# Patient Record
Sex: FEMALE | Race: WHITE | Employment: FULL TIME | URBAN - METROPOLITAN AREA
[De-identification: names, ages, dates, MRNs, and addresses within clinical notes are randomized per-mention and may not be internally consistent; named-entity substitution may affect disease eponyms.]

---

## 2017-02-17 ENCOUNTER — HOSPITAL ENCOUNTER (OUTPATIENT)
Dept: CT IMAGING | Facility: HOSPITAL | Age: 42
Discharge: HOME | End: 2017-02-17
Attending: INTERNAL MEDICINE
Payer: COMMERCIAL

## 2017-02-17 ENCOUNTER — LAB ENCOUNTER (OUTPATIENT)
Dept: LAB | Facility: HOSPITAL | Age: 42
End: 2017-02-17
Attending: INTERNAL MEDICINE
Payer: COMMERCIAL

## 2017-02-17 DIAGNOSIS — C85.88 MARGINAL ZONE LYMPHOMA OF LYMPH NODES OF MULTIPLE SITES (HCC): ICD-10-CM

## 2017-02-17 DIAGNOSIS — E55.9 VITAMIN D DEFICIENCY: ICD-10-CM

## 2017-02-17 DIAGNOSIS — N92.6 IRREGULAR MENSES: Primary | ICD-10-CM

## 2017-02-17 DIAGNOSIS — L65.9 ALOPECIA: ICD-10-CM

## 2017-02-17 LAB
25(OH)D3 SERPL-MCNC: 41.4 NG/ML
ALBUMIN SERPL BCP-MCNC: 3.8 G/DL (ref 3.5–4.8)
ALBUMIN/GLOB SERPL: 1.8 {RATIO} (ref 1–2)
ALP SERPL-CCNC: 49 U/L (ref 32–100)
ALT SERPL-CCNC: 13 U/L (ref 14–54)
ANION GAP SERPL CALC-SCNC: 5 MMOL/L (ref 0–18)
AST SERPL-CCNC: 17 U/L (ref 15–41)
BASOPHILS # BLD: 0 K/UL (ref 0–0.2)
BASOPHILS NFR BLD: 0 %
BILIRUB SERPL-MCNC: 0.8 MG/DL (ref 0.3–1.2)
BUN SERPL-MCNC: 11 MG/DL (ref 8–20)
BUN/CREAT SERPL: 11.8 (ref 10–20)
CALCIUM SERPL-MCNC: 8.8 MG/DL (ref 8.5–10.5)
CHLORIDE SERPL-SCNC: 106 MMOL/L (ref 95–110)
CHOLEST SERPL-MCNC: 114 MG/DL (ref 110–200)
CO2 SERPL-SCNC: 27 MMOL/L (ref 22–32)
CREAT BLD-MCNC: 0.9 MG/DL (ref 0.5–1.5)
CREAT SERPL-MCNC: 0.93 MG/DL (ref 0.5–1.5)
EOSINOPHIL # BLD: 0 K/UL (ref 0–0.7)
EOSINOPHIL NFR BLD: 1 %
ERYTHROCYTE [DISTWIDTH] IN BLOOD BY AUTOMATED COUNT: 14.6 % (ref 11–15)
FSH SERPL-ACNC: 8.9 MIU/ML
GLOBULIN PLAS-MCNC: 2.1 G/DL (ref 2.5–3.7)
GLUCOSE SERPL-MCNC: 96 MG/DL (ref 70–99)
HCT VFR BLD AUTO: 36 % (ref 35–48)
HDLC SERPL-MCNC: 30 MG/DL
HGB BLD-MCNC: 12.4 G/DL (ref 12–16)
LDH SERPL L TO P-CCNC: 137 U/L (ref 98–192)
LDLC SERPL CALC-MCNC: 74 MG/DL (ref 0–99)
LH SERPL-ACNC: 16.2 MIU/ML
LYMPHOCYTES # BLD: 1.8 K/UL (ref 1–4)
LYMPHOCYTES NFR BLD: 34 %
MCH RBC QN AUTO: 35.2 PG (ref 27–32)
MCHC RBC AUTO-ENTMCNC: 34.6 G/DL (ref 32–37)
MCV RBC AUTO: 101.9 FL (ref 80–100)
MONOCYTES # BLD: 0.3 K/UL (ref 0–1)
MONOCYTES NFR BLD: 6 %
NEUTROPHILS # BLD AUTO: 3 K/UL (ref 1.8–7.7)
NEUTROPHILS NFR BLD: 58 %
NONHDLC SERPL-MCNC: 84 MG/DL
OSMOLALITY UR CALC.SUM OF ELEC: 285 MOSM/KG (ref 275–295)
PLATELET # BLD AUTO: 85 K/UL (ref 140–400)
PMV BLD AUTO: 9.9 FL (ref 7.4–10.3)
POTASSIUM SERPL-SCNC: 4 MMOL/L (ref 3.3–5.1)
PROT SERPL-MCNC: 5.9 G/DL (ref 5.9–8.4)
RBC # BLD AUTO: 3.53 M/UL (ref 3.7–5.4)
SODIUM SERPL-SCNC: 138 MMOL/L (ref 136–144)
TRIGL SERPL-MCNC: 52 MG/DL (ref 1–149)
TSH SERPL-ACNC: 5.59 UIU/ML (ref 0.34–5.6)
WBC # BLD AUTO: 5.2 K/UL (ref 4–11)

## 2017-02-17 PROCEDURE — 83002 ASSAY OF GONADOTROPIN (LH): CPT

## 2017-02-17 PROCEDURE — 84443 ASSAY THYROID STIM HORMONE: CPT

## 2017-02-17 PROCEDURE — 83615 LACTATE (LD) (LDH) ENZYME: CPT

## 2017-02-17 PROCEDURE — 80061 LIPID PANEL: CPT

## 2017-02-17 PROCEDURE — 82565 ASSAY OF CREATININE: CPT

## 2017-02-17 PROCEDURE — 80053 COMPREHEN METABOLIC PANEL: CPT

## 2017-02-17 PROCEDURE — 74177 CT ABD & PELVIS W/CONTRAST: CPT

## 2017-02-17 PROCEDURE — 36415 COLL VENOUS BLD VENIPUNCTURE: CPT

## 2017-02-17 PROCEDURE — 83001 ASSAY OF GONADOTROPIN (FSH): CPT

## 2017-02-17 PROCEDURE — 70491 CT SOFT TISSUE NECK W/DYE: CPT

## 2017-02-17 PROCEDURE — 71260 CT THORAX DX C+: CPT

## 2017-02-17 PROCEDURE — 85025 COMPLETE CBC W/AUTO DIFF WBC: CPT

## 2017-02-17 PROCEDURE — 82306 VITAMIN D 25 HYDROXY: CPT

## 2017-03-01 ENCOUNTER — OFFICE VISIT (OUTPATIENT)
Dept: HEMATOLOGY/ONCOLOGY | Facility: HOSPITAL | Age: 42
End: 2017-03-01
Attending: INTERNAL MEDICINE
Payer: COMMERCIAL

## 2017-03-01 VITALS
TEMPERATURE: 98 F | HEART RATE: 56 BPM | DIASTOLIC BLOOD PRESSURE: 65 MMHG | SYSTOLIC BLOOD PRESSURE: 113 MMHG | WEIGHT: 132 LBS | RESPIRATION RATE: 16 BRPM | BODY MASS INDEX: 24.29 KG/M2 | HEIGHT: 62 IN

## 2017-03-01 DIAGNOSIS — C85.88 MARGINAL ZONE LYMPHOMA OF LYMPH NODES OF MULTIPLE SITES (HCC): Primary | ICD-10-CM

## 2017-03-01 PROCEDURE — 99212 OFFICE O/P EST SF 10 MIN: CPT | Performed by: INTERNAL MEDICINE

## 2017-03-01 PROCEDURE — 99214 OFFICE O/P EST MOD 30 MIN: CPT | Performed by: INTERNAL MEDICINE

## 2017-03-01 RX ORDER — LEVOTHYROXINE SODIUM 0.05 MG/1
TABLET ORAL
Refills: 3 | COMMUNITY
Start: 2017-02-23 | End: 2017-07-06

## 2017-03-01 NOTE — PROGRESS NOTES
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 Negative for dizziness, light-headedness and numbness. Hematological: Positive for adenopathy (skin neck and R upper arm.). Bruises/bleeds easily.    Psychiatric/Behavioral: Negative for confusion and sleep • Cancer Maternal Grandfather      lung cancer         PHYSICAL EXAM:    /65 mmHg  Pulse 56  Temp(Src) 98 °F (36.7 °C) (Oral)  Resp 16  Ht 1.575 m (5' 2\")  Wt 59.875 kg (132 lb)  BMI 24.14 kg/m2     Wt Readings from Last 6 Encounters:  03/01/17 : 59 Left cervical: No superficial cervical, no deep cervical and no posterior cervical adenopathy present. She has no axillary adenopathy. Right axillary: No pectoral and no lateral adenopathy present.         Left axillary: No pectoral and no l D/w the patient that if she develops new GI symptoms, should have evaluation by GI with endoscopy to r/o involvement of the GI tract as this is a common site of involvement for marginal zone lymphoma.     Migraines:  rec d/w PCP and also eye exam as has not 34 44   Monocytes %       6 6   Eosinophils %       1 1   Basophils %       0 1   Neutrophils Absolute      1.8-7.7 K/UL 3.0 2.6   Lymphocytes Absolute      1.0-4.0 K/UL 1.8 2.4   Monocytes Absolute      0.0-1.0 K/UL 0.3 0.3   Eosinophils Absolute OTHER:            Posterior cervical soft tissue nodules measure up to approximately 4 mm. Surgical changes present in the right posterolateral cervical region. No recurrent disease responding to this region.  The left posterior nodules previously measured x 3.4 cm (remeasured). More inferiorly there is a precaval low density soft tissue mass measuring 4.8 x 2.9 cm, previously 3.8 x 2.1 cm. Multiple additional low attenuation retroperitoneal lymph nodes are present.  Left pericardiac index node measures 11

## 2017-05-03 ENCOUNTER — TELEPHONE (OUTPATIENT)
Dept: HEMATOLOGY/ONCOLOGY | Facility: HOSPITAL | Age: 42
End: 2017-05-03

## 2017-05-03 NOTE — TELEPHONE ENCOUNTER
PLEASE CALL PT TODAY [phone] REGARDING PT'S CT CHEST/AB/PELVIS HAS BEEN PENDING REVIEW THRU INS SINCE 3/1/17-KALE

## 2017-05-10 ENCOUNTER — TELEPHONE (OUTPATIENT)
Dept: HEMATOLOGY/ONCOLOGY | Facility: HOSPITAL | Age: 42
End: 2017-05-10

## 2017-05-12 ENCOUNTER — TELEPHONE (OUTPATIENT)
Dept: HEMATOLOGY/ONCOLOGY | Facility: HOSPITAL | Age: 42
End: 2017-05-12

## 2017-05-15 ENCOUNTER — TELEPHONE (OUTPATIENT)
Dept: HEMATOLOGY/ONCOLOGY | Facility: HOSPITAL | Age: 42
End: 2017-05-15

## 2017-05-15 DIAGNOSIS — C85.88 MARGINAL ZONE LYMPHOMA OF LYMPH NODES OF MULTIPLE SITES (HCC): Primary | ICD-10-CM

## 2017-05-15 NOTE — TELEPHONE ENCOUNTER
I called Phyllis Olivares to confirm authorization has been completed and she is able to schedule CT. She is requesting a call back from \"Dr. Elijah Gabriel RN. I feel I should also have one on my neck\".

## 2017-05-15 NOTE — TELEPHONE ENCOUNTER
Returned phone call. Pt states \" I feel like the lymph nodes in my neck are a little more raised and feel strained. I usually have a CT of my neck at the same time as my other CT. Should I have a CT of my neck too? \" Pt denies pain in neck states \" Just

## 2017-05-16 ENCOUNTER — TELEPHONE (OUTPATIENT)
Dept: HEMATOLOGY/ONCOLOGY | Facility: HOSPITAL | Age: 42
End: 2017-05-16

## 2017-05-16 NOTE — TELEPHONE ENCOUNTER
Pt called and left message that an order for CT soft tissue of neck and CT chest, abdomen and pelvis was ordered per Dr. Venkatesh Pérez. Informed to wait till authorized by insurance before scheduling.  Instructed to call if had any further questions or concerns [phone]

## 2017-05-18 DIAGNOSIS — C85.88 MARGINAL ZONE LYMPHOMA OF LYMPH NODES OF MULTIPLE SITES (HCC): Primary | ICD-10-CM

## 2017-06-01 ENCOUNTER — HOSPITAL ENCOUNTER (OUTPATIENT)
Dept: CT IMAGING | Facility: HOSPITAL | Age: 42
Discharge: HOME | End: 2017-06-01
Attending: INTERNAL MEDICINE
Payer: COMMERCIAL

## 2017-06-01 ENCOUNTER — LAB ENCOUNTER (OUTPATIENT)
Dept: LAB | Facility: HOSPITAL | Age: 42
End: 2017-06-01
Attending: INTERNAL MEDICINE
Payer: COMMERCIAL

## 2017-06-01 DIAGNOSIS — C83.38 RETICULOSARCOMA OF LYMPH NODES OF MULTIPLE SITES (HCC): Primary | ICD-10-CM

## 2017-06-01 DIAGNOSIS — E03.9 HYPOTHYROIDISM, UNSPECIFIED TYPE: ICD-10-CM

## 2017-06-01 DIAGNOSIS — C85.88 MARGINAL ZONE LYMPHOMA OF LYMPH NODES OF MULTIPLE SITES (HCC): ICD-10-CM

## 2017-06-01 PROCEDURE — 71260 CT THORAX DX C+: CPT | Performed by: INTERNAL MEDICINE

## 2017-06-01 PROCEDURE — 74177 CT ABD & PELVIS W/CONTRAST: CPT | Performed by: INTERNAL MEDICINE

## 2017-06-01 PROCEDURE — 80053 COMPREHEN METABOLIC PANEL: CPT

## 2017-06-01 PROCEDURE — 36415 COLL VENOUS BLD VENIPUNCTURE: CPT

## 2017-06-01 PROCEDURE — 83615 LACTATE (LD) (LDH) ENZYME: CPT

## 2017-06-01 PROCEDURE — 70491 CT SOFT TISSUE NECK W/DYE: CPT | Performed by: INTERNAL MEDICINE

## 2017-06-01 PROCEDURE — 85025 COMPLETE CBC W/AUTO DIFF WBC: CPT

## 2017-06-01 PROCEDURE — 84443 ASSAY THYROID STIM HORMONE: CPT

## 2017-06-06 ENCOUNTER — OFFICE VISIT (OUTPATIENT)
Dept: HEMATOLOGY/ONCOLOGY | Facility: HOSPITAL | Age: 42
End: 2017-06-06
Attending: INTERNAL MEDICINE
Payer: COMMERCIAL

## 2017-06-06 VITALS
SYSTOLIC BLOOD PRESSURE: 106 MMHG | TEMPERATURE: 98 F | HEIGHT: 62 IN | RESPIRATION RATE: 16 BRPM | BODY MASS INDEX: 23.92 KG/M2 | DIASTOLIC BLOOD PRESSURE: 70 MMHG | HEART RATE: 48 BPM | WEIGHT: 130 LBS

## 2017-06-06 DIAGNOSIS — C85.88 MARGINAL ZONE LYMPHOMA OF LYMPH NODES OF MULTIPLE SITES (HCC): Primary | ICD-10-CM

## 2017-06-06 PROCEDURE — 99212 OFFICE O/P EST SF 10 MIN: CPT | Performed by: INTERNAL MEDICINE

## 2017-06-06 PROCEDURE — 99214 OFFICE O/P EST MOD 30 MIN: CPT | Performed by: INTERNAL MEDICINE

## 2017-06-06 NOTE — PROGRESS NOTES
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 Neurological: Negative for dizziness, light-headedness, numbness and headaches. Hematological: Positive for adenopathy (skin neck and R upper arm.). Bruises/bleeds easily.    Psychiatric/Behavi • Cancer Maternal Grandmother      ovarian cancer   • Cancer Maternal Grandfather      lung cancer         PHYSICAL EXAM:    /70 mmHg  Pulse 48  Temp(Src) 97.7 °F (36.5 °C) (Oral)  Resp 16  Ht 1.575 m (5' 2\")  Wt 58.968 kg (130 lb)  BMI 23.77 kg/m2 Right cervical: No superficial cervical, no deep cervical and no posterior cervical adenopathy present. Left cervical: No superficial cervical, no deep cervical and no posterior cervical adenopathy present. She has no axillary adenopathy. PET/CT is recommended only at time of indication for treatment initiation.     D/w the patient that if she develops new GI symptoms, should have evaluation by GI with endoscopy to r/o involvement of the GI tract as this is a common site of involvement for m 27.0-32.0 pg 33.4 (H) 35.2 (H) 34.0 (H)   MCHC      32.0-37.0 g/dl 34.6 34.6 34.3   RDW      11.0-15.0 % 14.6 14.6 14.6   Platelet Count      555-050 K/ (L) 85 (L) 88 (L)   MEAN PLATELET VOLUME      7.4-10.3 fL 9.6 9.9 9.4   Neutrophils %       5 UPPER AERODIGESTIVE TRACT:         Nasopharynx and oropharynx are normal. Dental hardware limits evaluation of the oral cavity. No oral cavity mass. Epiglottis, glottis and subglottic airway are normal. No suspect mass in the upper aerodigestive tract.   PA * Trachea and mainstem bronchi are normal.    CHEST WALL:  Cutaneous and subcutaneous left upper back soft tissue nodularity unchanged. Largest left axillary node measures 10 x 12 mm. No axillary node meeting size criteria for enlargement.   OTHER: Approved by (CST): Shilpa Masters MD on 6/01/2017 at 12:48            =====  CONCLUSION:    1.  Cutaneous and subcutaneous nodularity related to lymphomatous infiltration left posterior base of neck, left shoulder and back probably unchanged when accounting

## 2017-06-12 NOTE — PROGRESS NOTES
Quick Note:    Please have patient make appointment to discuss blood test results and adjust medication.  Thank you.  ______

## 2017-06-14 NOTE — PROGRESS NOTES
Quick Note:    [facility] pt [facility]    Patient declined offer to schedule an appointment.        ______

## 2017-08-22 ENCOUNTER — HOSPITAL ENCOUNTER (OUTPATIENT)
Dept: ULTRASOUND IMAGING | Facility: HOSPITAL | Age: 42
Discharge: HOME | End: 2017-08-22
Attending: INTERNAL MEDICINE
Payer: COMMERCIAL

## 2017-08-22 DIAGNOSIS — N92.6 IRREGULAR MENSES: ICD-10-CM

## 2017-08-22 PROCEDURE — 76856 US EXAM PELVIC COMPLETE: CPT | Performed by: INTERNAL MEDICINE

## 2017-08-22 PROCEDURE — 76830 TRANSVAGINAL US NON-OB: CPT | Performed by: INTERNAL MEDICINE

## 2017-09-09 ENCOUNTER — LAB ENCOUNTER (OUTPATIENT)
Dept: LAB | Facility: HOSPITAL | Age: 42
End: 2017-09-09
Attending: INTERNAL MEDICINE
Payer: COMMERCIAL

## 2017-09-09 DIAGNOSIS — C85.88 MARGINAL ZONE LYMPHOMA OF LYMPH NODES OF MULTIPLE SITES (HCC): ICD-10-CM

## 2017-09-09 DIAGNOSIS — E03.9 HYPOTHYROIDISM, UNSPECIFIED TYPE: ICD-10-CM

## 2017-09-09 LAB
ALBUMIN SERPL BCP-MCNC: 4 G/DL (ref 3.5–4.8)
ALBUMIN/GLOB SERPL: 1.6 {RATIO} (ref 1–2)
ALP SERPL-CCNC: 61 U/L (ref 32–100)
ALT SERPL-CCNC: 15 U/L (ref 14–54)
ANION GAP SERPL CALC-SCNC: 5 MMOL/L (ref 0–18)
AST SERPL-CCNC: 18 U/L (ref 15–41)
BASOPHILS # BLD: 0 K/UL (ref 0–0.2)
BASOPHILS NFR BLD: 1 %
BILIRUB SERPL-MCNC: 0.9 MG/DL (ref 0.3–1.2)
BUN SERPL-MCNC: 12 MG/DL (ref 8–20)
BUN/CREAT SERPL: 13.5 (ref 10–20)
CALCIUM SERPL-MCNC: 9.7 MG/DL (ref 8.5–10.5)
CHLORIDE SERPL-SCNC: 105 MMOL/L (ref 95–110)
CO2 SERPL-SCNC: 29 MMOL/L (ref 22–32)
CREAT SERPL-MCNC: 0.89 MG/DL (ref 0.5–1.5)
EOSINOPHIL # BLD: 0 K/UL (ref 0–0.7)
EOSINOPHIL NFR BLD: 1 %
ERYTHROCYTE [DISTWIDTH] IN BLOOD BY AUTOMATED COUNT: 14.7 % (ref 11–15)
GLOBULIN PLAS-MCNC: 2.5 G/DL (ref 2.5–3.7)
GLUCOSE SERPL-MCNC: 79 MG/DL (ref 70–99)
HCT VFR BLD AUTO: 36.8 % (ref 35–48)
HGB BLD-MCNC: 13.1 G/DL (ref 12–16)
LDH SERPL L TO P-CCNC: 170 U/L (ref 98–192)
LYMPHOCYTES # BLD: 1.8 K/UL (ref 1–4)
LYMPHOCYTES NFR BLD: 36 %
MCH RBC QN AUTO: 35 PG (ref 27–32)
MCHC RBC AUTO-ENTMCNC: 35.7 G/DL (ref 32–37)
MCV RBC AUTO: 98 FL (ref 80–100)
MONOCYTES # BLD: 0.5 K/UL (ref 0–1)
MONOCYTES NFR BLD: 9 %
NEUTROPHILS # BLD AUTO: 2.8 K/UL (ref 1.8–7.7)
NEUTROPHILS NFR BLD: 54 %
OSMOLALITY UR CALC.SUM OF ELEC: 287 MOSM/KG (ref 275–295)
PLATELET # BLD AUTO: 98 K/UL (ref 140–400)
PMV BLD AUTO: 9.1 FL (ref 7.4–10.3)
POTASSIUM SERPL-SCNC: 4.6 MMOL/L (ref 3.3–5.1)
PROT SERPL-MCNC: 6.5 G/DL (ref 5.9–8.4)
RBC # BLD AUTO: 3.76 M/UL (ref 3.7–5.4)
SODIUM SERPL-SCNC: 139 MMOL/L (ref 136–144)
TSH SERPL-ACNC: 2.21 UIU/ML (ref 0.45–5.33)
WBC # BLD AUTO: 5.2 K/UL (ref 4–11)

## 2017-09-09 PROCEDURE — 80053 COMPREHEN METABOLIC PANEL: CPT

## 2017-09-09 PROCEDURE — 84443 ASSAY THYROID STIM HORMONE: CPT

## 2017-09-09 PROCEDURE — 36415 COLL VENOUS BLD VENIPUNCTURE: CPT

## 2017-09-09 PROCEDURE — 83615 LACTATE (LD) (LDH) ENZYME: CPT

## 2017-09-09 PROCEDURE — 85025 COMPLETE CBC W/AUTO DIFF WBC: CPT

## 2017-09-12 ENCOUNTER — OFFICE VISIT (OUTPATIENT)
Dept: HEMATOLOGY/ONCOLOGY | Facility: HOSPITAL | Age: 42
End: 2017-09-12
Attending: INTERNAL MEDICINE
Payer: COMMERCIAL

## 2017-09-12 VITALS
HEART RATE: 56 BPM | WEIGHT: 130 LBS | HEIGHT: 62 IN | TEMPERATURE: 97 F | BODY MASS INDEX: 23.92 KG/M2 | RESPIRATION RATE: 16 BRPM | DIASTOLIC BLOOD PRESSURE: 66 MMHG | SYSTOLIC BLOOD PRESSURE: 118 MMHG

## 2017-09-12 DIAGNOSIS — C85.80 MARGINAL ZONE LYMPHOMA (HCC): Primary | ICD-10-CM

## 2017-09-12 PROCEDURE — 99212 OFFICE O/P EST SF 10 MIN: CPT | Performed by: INTERNAL MEDICINE

## 2017-09-12 PROCEDURE — 99213 OFFICE O/P EST LOW 20 MIN: CPT | Performed by: INTERNAL MEDICINE

## 2017-09-12 NOTE — PROGRESS NOTES
VERÓNICA     Rene Lynne is a 36year old female here for f/u of Marginal zone lymphoma (hcc)  (primary encounter diagnosis)     Pt saw Dr Austen Lima at [facility] for a second opinion (Dec 2015) who agreed on observation only for the time being.       Pt here today Allergic/Immunologic: Positive for environmental allergies. Neurological: Negative for dizziness, weakness, light-headedness, numbness and headaches. Has had vertigo   Hematological: Positive for adenopathy (skin neck and R upper arm.).  Bruises/bl • Cancer Mother      Ovarian- passed away at 46   • Cancer Maternal Grandmother      ovarian cancer   • Cancer Maternal Grandfather      lung cancer         PHYSICAL EXAM:    /66 (BP Location: Left arm, Patient Position: Sitting, Cuff Size: adult) Head (left side): No submental, no submandibular, no tonsillar, no preauricular, no posterior auricular and no occipital adenopathy present. She has no cervical adenopathy.         Right cervical: No superficial cervical, no deep cervical and no po CT done and she has stable disease. She is still minimally symptomatic. Patient was reassured and is to call if new symptoms arise that indicate progression. PET/CT is recommended only at time of indication for treatment initiation.     D/w the pierre MCHC      32.0 - 37.0 g/dl 35.7 34.6 34.6   RDW      11.0 - 15.0 % 14.7 14.6 14.6   Platelet Count      889 - 400 K/UL 98 (L) 100 (L) 85 (L)   MEAN PLATELET VOLUME      7.4 - 10.3 fL 9.1 9.6 9.9   Neutrophils %      % 54 56 58   Lymphocytes %      % 36 37

## 2017-10-17 NOTE — PROGRESS NOTES
[phone] ([place])   Spoke with patient and informed of MD instructions. Patient verbalized understanding.

## 2017-11-29 ENCOUNTER — TELEPHONE (OUTPATIENT)
Dept: HEMATOLOGY/ONCOLOGY | Facility: HOSPITAL | Age: 42
End: 2017-11-29

## 2017-11-29 NOTE — TELEPHONE ENCOUNTER
Marisol Zamarripa call and ask to switch Doctors. She ask to schedule an appointment with Dr. Breanne Hughes. Marisol Zamarripa was feeling bad about asking to switch Doctors. She stated that she isn't getting the clarity she needs from Dr. Yuri Benito. She stated, she called on 11/28/17 and was asked to see Dr. Yuri Benito once more and to explain to Dr. Yuri Benito that she's not receiving the clarity that she needs along with how she feels about her visits with Dr. Yuri Benito. Today she called to cancel the appointment with Dr. Yuri Benito on 12/19/17 and then rescheduled an appointment with Dr. Breanne Hughes on 12/18/17. I needed to add that, Marisol Zamarripa has CT scans ordered by Dr. Yuri Benito that are still pending. This is an FYI. Jose M Saucedo

## 2017-12-13 ENCOUNTER — LAB ENCOUNTER (OUTPATIENT)
Dept: LAB | Age: 42
End: 2017-12-13
Attending: INTERNAL MEDICINE
Payer: COMMERCIAL

## 2017-12-13 ENCOUNTER — HOSPITAL ENCOUNTER (OUTPATIENT)
Dept: CT IMAGING | Age: 42
Discharge: HOME | End: 2017-12-13
Attending: INTERNAL MEDICINE
Payer: COMMERCIAL

## 2017-12-13 DIAGNOSIS — R59.1 LYMPHADENOPATHY: ICD-10-CM

## 2017-12-13 DIAGNOSIS — C85.80 MARGINAL ZONE LYMPHOMA (HCC): ICD-10-CM

## 2017-12-13 DIAGNOSIS — C85.88 MARGINAL ZONE LYMPHOMA OF LYMPH NODES OF MULTIPLE SITES (HCC): ICD-10-CM

## 2017-12-13 PROCEDURE — 70491 CT SOFT TISSUE NECK W/DYE: CPT | Performed by: INTERNAL MEDICINE

## 2017-12-13 PROCEDURE — 71260 CT THORAX DX C+: CPT | Performed by: INTERNAL MEDICINE

## 2017-12-13 PROCEDURE — 82565 ASSAY OF CREATININE: CPT

## 2017-12-13 PROCEDURE — 36415 COLL VENOUS BLD VENIPUNCTURE: CPT

## 2017-12-13 PROCEDURE — 85025 COMPLETE CBC W/AUTO DIFF WBC: CPT

## 2017-12-13 PROCEDURE — 80053 COMPREHEN METABOLIC PANEL: CPT

## 2017-12-13 PROCEDURE — 74177 CT ABD & PELVIS W/CONTRAST: CPT | Performed by: INTERNAL MEDICINE

## 2017-12-13 PROCEDURE — 83615 LACTATE (LD) (LDH) ENZYME: CPT

## 2017-12-18 ENCOUNTER — OFFICE VISIT (OUTPATIENT)
Dept: HEMATOLOGY/ONCOLOGY | Facility: HOSPITAL | Age: 42
End: 2017-12-18
Attending: INTERNAL MEDICINE
Payer: COMMERCIAL

## 2017-12-18 ENCOUNTER — TELEPHONE (OUTPATIENT)
Dept: HEMATOLOGY/ONCOLOGY | Facility: HOSPITAL | Age: 42
End: 2017-12-18

## 2017-12-18 VITALS
DIASTOLIC BLOOD PRESSURE: 60 MMHG | HEIGHT: 62 IN | WEIGHT: 132 LBS | RESPIRATION RATE: 16 BRPM | TEMPERATURE: 99 F | BODY MASS INDEX: 24.29 KG/M2 | HEART RATE: 57 BPM | SYSTOLIC BLOOD PRESSURE: 125 MMHG

## 2017-12-18 DIAGNOSIS — C85.88 MARGINAL ZONE LYMPHOMA OF LYMPH NODES OF MULTIPLE SITES (HCC): Primary | ICD-10-CM

## 2017-12-18 PROCEDURE — 99212 OFFICE O/P EST SF 10 MIN: CPT | Performed by: INTERNAL MEDICINE

## 2017-12-18 PROCEDURE — 99214 OFFICE O/P EST MOD 30 MIN: CPT | Performed by: INTERNAL MEDICINE

## 2017-12-18 NOTE — PROGRESS NOTES
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  Pt wa Genitourinary: Negative for difficulty urinating, dysuria, flank pain, hematuria, menstrual problem, pelvic pain, urgency and vaginal pain. Musculoskeletal: Positive for back pain (feels tight in the upper back, states always. ).  Negative for arthralgia Alcohol use Yes  0.0 oz/week     Comment: a drinks a week, on weekends    Drug use: Yes     Other Topics Concern    Caffeine Concern Yes    Comment: 1 cup of coffee     Social History Narrative   None on file     Family History   Problem Relation Age of O Head (right side): No submental, no submandibular, no tonsillar, no preauricular, no posterior auricular and no occipital adenopathy present.         Head (left side): No submental, no submandibular, no tonsillar, no preauricular, no posterior auricula 7. Please note that the distal colon is decompressed and suboptimally evaluated. Appearance is most likely related to underdistention although low-grade infectious/inflammatory colitis remains a less likely possibility.   8. Mild chronic T3 and T4 vertebral Follow up as per NCCN Guidelines v 3.2017 for indications for treatment and surveillance.     Treatment indicated if patient is a candidate for a clinical trial, symptomatic, threatened end-organ function, cytopenias secondary to the lymphoma, bulky disease I spent 40 minutes face to face with the patient. More than 50% of that time was spent counseling the patient and/or on coordination of care. The diagnosis, prognosis, and general treatment was explained to the patient and the family.     KWADWO Greer

## 2017-12-19 ENCOUNTER — APPOINTMENT (OUTPATIENT)
Dept: HEMATOLOGY/ONCOLOGY | Facility: HOSPITAL | Age: 42
End: 2017-12-19
Payer: COMMERCIAL

## 2018-02-15 ENCOUNTER — TELEPHONE (OUTPATIENT)
Dept: HEMATOLOGY/ONCOLOGY | Facility: HOSPITAL | Age: 43
End: 2018-02-15

## 2018-02-15 NOTE — TELEPHONE ENCOUNTER
Quita Fox called to schedule an appointment with Dr Kendra Mcguire. She previously ask to be switch to see Dr. Mani Ching. She was seen by   Now, the patient wants to see Dr. Kendra Mcguire again.  Please Advise thanks

## 2018-03-12 ENCOUNTER — HOSPITAL ENCOUNTER (OUTPATIENT)
Dept: MAMMOGRAPHY | Facility: HOSPITAL | Age: 43
Discharge: HOME | End: 2018-03-12
Attending: OBSTETRICS & GYNECOLOGY
Payer: COMMERCIAL

## 2018-03-12 ENCOUNTER — LAB ENCOUNTER (OUTPATIENT)
Dept: LAB | Facility: HOSPITAL | Age: 43
End: 2018-03-12
Attending: OBSTETRICS & GYNECOLOGY
Payer: COMMERCIAL

## 2018-03-12 DIAGNOSIS — R92.0 MAMMOGRAPHIC MICROCALCIFICATION FOUND ON DIAGNOSTIC IMAGING OF BREAST: ICD-10-CM

## 2018-03-12 DIAGNOSIS — C85.88 MARGINAL ZONE LYMPHOMA OF LYMPH NODES OF MULTIPLE SITES (HCC): ICD-10-CM

## 2018-03-12 LAB
ALBUMIN SERPL BCP-MCNC: 3.8 G/DL (ref 3.5–4.8)
ALBUMIN/GLOB SERPL: 1.9 {RATIO} (ref 1–2)
ALP SERPL-CCNC: 50 U/L (ref 32–100)
ALT SERPL-CCNC: 11 U/L (ref 14–54)
ANION GAP SERPL CALC-SCNC: 8 MMOL/L (ref 0–18)
AST SERPL-CCNC: 18 U/L (ref 15–41)
BASOPHILS # BLD: 0 K/UL (ref 0–0.2)
BASOPHILS NFR BLD: 0 %
BILIRUB SERPL-MCNC: 0.6 MG/DL (ref 0.3–1.2)
BUN SERPL-MCNC: 13 MG/DL (ref 8–20)
BUN/CREAT SERPL: 14 (ref 10–20)
CALCIUM SERPL-MCNC: 9.2 MG/DL (ref 8.5–10.5)
CHLORIDE SERPL-SCNC: 105 MMOL/L (ref 95–110)
CO2 SERPL-SCNC: 26 MMOL/L (ref 22–32)
CREAT SERPL-MCNC: 0.93 MG/DL (ref 0.5–1.5)
EOSINOPHIL # BLD: 0.1 K/UL (ref 0–0.7)
EOSINOPHIL NFR BLD: 1 %
ERYTHROCYTE [DISTWIDTH] IN BLOOD BY AUTOMATED COUNT: 15.2 % (ref 11–15)
GLOBULIN PLAS-MCNC: 2 G/DL (ref 2.5–3.7)
GLUCOSE SERPL-MCNC: 102 MG/DL (ref 70–99)
HCT VFR BLD AUTO: 36.2 % (ref 35–48)
HGB BLD-MCNC: 12 G/DL (ref 12–16)
LDH SERPL L TO P-CCNC: 141 U/L (ref 98–192)
LYMPHOCYTES # BLD: 2.1 K/UL (ref 1–4)
LYMPHOCYTES NFR BLD: 41 %
MCH RBC QN AUTO: 29.1 PG (ref 27–32)
MCHC RBC AUTO-ENTMCNC: 33.2 G/DL (ref 32–37)
MCV RBC AUTO: 87.7 FL (ref 80–100)
MONOCYTES # BLD: 0.3 K/UL (ref 0–1)
MONOCYTES NFR BLD: 7 %
NEUTROPHILS # BLD AUTO: 2.6 K/UL (ref 1.8–7.7)
NEUTROPHILS NFR BLD: 51 %
OSMOLALITY UR CALC.SUM OF ELEC: 288 MOSM/KG (ref 275–295)
PATIENT FASTING: NO
PLATELET # BLD AUTO: 87 K/UL (ref 140–400)
PMV BLD AUTO: 9.1 FL (ref 7.4–10.3)
POTASSIUM SERPL-SCNC: 4.2 MMOL/L (ref 3.3–5.1)
PROT SERPL-MCNC: 5.8 G/DL (ref 5.9–8.4)
RBC # BLD AUTO: 4.13 M/UL (ref 3.7–5.4)
SODIUM SERPL-SCNC: 139 MMOL/L (ref 136–144)
WBC # BLD AUTO: 5.2 K/UL (ref 4–11)

## 2018-03-12 PROCEDURE — 85025 COMPLETE CBC W/AUTO DIFF WBC: CPT

## 2018-03-12 PROCEDURE — 36415 COLL VENOUS BLD VENIPUNCTURE: CPT

## 2018-03-12 PROCEDURE — 77066 DX MAMMO INCL CAD BI: CPT | Performed by: OBSTETRICS & GYNECOLOGY

## 2018-03-12 PROCEDURE — 83615 LACTATE (LD) (LDH) ENZYME: CPT

## 2018-03-12 PROCEDURE — 80053 COMPREHEN METABOLIC PANEL: CPT

## 2018-03-12 PROCEDURE — 77062 BREAST TOMOSYNTHESIS BI: CPT | Performed by: OBSTETRICS & GYNECOLOGY

## 2018-03-20 ENCOUNTER — LAB ENCOUNTER (OUTPATIENT)
Dept: LAB | Facility: HOSPITAL | Age: 43
End: 2018-03-20
Attending: INTERNAL MEDICINE
Payer: COMMERCIAL

## 2018-03-20 ENCOUNTER — OFFICE VISIT (OUTPATIENT)
Dept: HEMATOLOGY/ONCOLOGY | Facility: HOSPITAL | Age: 43
End: 2018-03-20
Attending: INTERNAL MEDICINE
Payer: COMMERCIAL

## 2018-03-20 VITALS
WEIGHT: 131 LBS | TEMPERATURE: 98 F | DIASTOLIC BLOOD PRESSURE: 67 MMHG | HEIGHT: 62 IN | SYSTOLIC BLOOD PRESSURE: 117 MMHG | RESPIRATION RATE: 16 BRPM | BODY MASS INDEX: 24.11 KG/M2 | HEART RATE: 64 BPM

## 2018-03-20 DIAGNOSIS — E06.3 HYPOTHYROIDISM DUE TO HASHIMOTO'S THYROIDITIS: ICD-10-CM

## 2018-03-20 DIAGNOSIS — E03.8 HYPOTHYROIDISM DUE TO HASHIMOTO'S THYROIDITIS: ICD-10-CM

## 2018-03-20 DIAGNOSIS — C85.80 MARGINAL ZONE LYMPHOMA (HCC): Primary | ICD-10-CM

## 2018-03-20 DIAGNOSIS — R59.0 LYMPHADENOPATHY OF RIGHT CERVICAL REGION: ICD-10-CM

## 2018-03-20 LAB — TSH SERPL-ACNC: 4.78 UIU/ML (ref 0.45–5.33)

## 2018-03-20 PROCEDURE — 36415 COLL VENOUS BLD VENIPUNCTURE: CPT

## 2018-03-20 PROCEDURE — 84443 ASSAY THYROID STIM HORMONE: CPT

## 2018-03-20 PROCEDURE — 99215 OFFICE O/P EST HI 40 MIN: CPT | Performed by: INTERNAL MEDICINE

## 2018-03-20 NOTE — PROGRESS NOTES
VERÓNICA Negron is a 39year old female here for f/u of Marginal zone lymphoma (hcc)  (primary encounter diagnosis)  Lymphadenopathy of right cervical region     Pt saw Dr Ronda Sampson at [facility] for a second opinion (Dec 2015) who agreed on observation o Mild stress incontinence with sneezing, coughing or laughing. Musculoskeletal: Positive for back pain (feels tight in the upper back, states always.  ) and gait problem (states has always been bad as she has walked in to walls. ).  Negative for ar Smoking status: Never Smoker    Smokeless tobacco: Never Used    Alcohol use Yes  0.0 oz/week     Comment: a drinks a week, on weekends    Drug use: Yes     Other Topics Concern    Caffeine Concern Yes    Comment: 1 cup of coffee     Social History Narrat Genitourinary: No vaginal discharge found. Musculoskeletal: She exhibits no edema or tenderness.    Lymphadenopathy:        Head (right side): No submental, no submandibular, no tonsillar, no preauricular, no posterior auricular and no occipital adenopath Continue with surveillance is with H&P and labs every 3-6 mo for 5 yrs and then annually as clinically indicated. Surveillance imaging recommended for up to 2 yrs from diagnosis is with CT scan no more than every 6 months.       New disease under the L axi GLOBULIN, TOTAL      2.5 - 3.7 g/dL 2.0 (L)   A/G RATIO      1.0 - 2.0 1.9   ANION GAP      0 - 18 mmol/L 8   BUN/CREA RATIO      10.0 - 20.0 14.0   CALCULATED OSMOLALITY      275 - 295 mOsm/kg 288   GFR, Non-      >=60 >60   GFR, -A CALCULATED OSMOLALITY      275 - 295 mOsm/kg 287 288 285   GFR, Non-      >=60 >60 57 (L) >60   GFR, -American      >=60 >60 >60 >60   WBC      4.0 - 11.0 K/UL 5.2 4.8 5.2   RBC      3.70 - 5.40 M/UL 3.76 4.03 3.53 (L)   Hemoglobin

## 2018-06-20 ENCOUNTER — APPOINTMENT (OUTPATIENT)
Dept: HEMATOLOGY/ONCOLOGY | Facility: HOSPITAL | Age: 43
End: 2018-06-20
Attending: INTERNAL MEDICINE
Payer: COMMERCIAL

## 2018-06-21 ENCOUNTER — LAB ENCOUNTER (OUTPATIENT)
Dept: LAB | Age: 43
End: 2018-06-21
Attending: INTERNAL MEDICINE
Payer: COMMERCIAL

## 2018-06-21 ENCOUNTER — HOSPITAL ENCOUNTER (OUTPATIENT)
Dept: CT IMAGING | Age: 43
Discharge: HOME | End: 2018-06-21
Attending: INTERNAL MEDICINE
Payer: COMMERCIAL

## 2018-06-21 ENCOUNTER — APPOINTMENT (OUTPATIENT)
Dept: CT IMAGING | Age: 43
End: 2018-06-21
Attending: INTERNAL MEDICINE
Payer: COMMERCIAL

## 2018-06-21 DIAGNOSIS — C85.80 MARGINAL ZONE LYMPHOMA (HCC): ICD-10-CM

## 2018-06-21 DIAGNOSIS — R59.0 LYMPHADENOPATHY OF RIGHT CERVICAL REGION: ICD-10-CM

## 2018-06-21 DIAGNOSIS — C85.88 MARGINAL ZONE LYMPHOMA OF LYMPH NODES OF MULTIPLE SITES (HCC): ICD-10-CM

## 2018-06-21 PROCEDURE — 82565 ASSAY OF CREATININE: CPT

## 2018-06-21 PROCEDURE — 83615 LACTATE (LD) (LDH) ENZYME: CPT

## 2018-06-21 PROCEDURE — 71260 CT THORAX DX C+: CPT | Performed by: INTERNAL MEDICINE

## 2018-06-21 PROCEDURE — 36415 COLL VENOUS BLD VENIPUNCTURE: CPT

## 2018-06-21 PROCEDURE — 80053 COMPREHEN METABOLIC PANEL: CPT

## 2018-06-21 PROCEDURE — 70491 CT SOFT TISSUE NECK W/DYE: CPT | Performed by: INTERNAL MEDICINE

## 2018-06-21 PROCEDURE — 74177 CT ABD & PELVIS W/CONTRAST: CPT | Performed by: INTERNAL MEDICINE

## 2018-06-21 PROCEDURE — 85025 COMPLETE CBC W/AUTO DIFF WBC: CPT

## 2018-06-27 ENCOUNTER — OFFICE VISIT (OUTPATIENT)
Dept: HEMATOLOGY/ONCOLOGY | Facility: HOSPITAL | Age: 43
End: 2018-06-27
Attending: INTERNAL MEDICINE
Payer: COMMERCIAL

## 2018-06-27 VITALS
TEMPERATURE: 98 F | SYSTOLIC BLOOD PRESSURE: 117 MMHG | HEIGHT: 62 IN | WEIGHT: 125 LBS | HEART RATE: 62 BPM | RESPIRATION RATE: 16 BRPM | DIASTOLIC BLOOD PRESSURE: 86 MMHG | BODY MASS INDEX: 23 KG/M2

## 2018-06-27 DIAGNOSIS — C85.80 MARGINAL ZONE LYMPHOMA (HCC): Primary | ICD-10-CM

## 2018-06-27 PROCEDURE — 99214 OFFICE O/P EST MOD 30 MIN: CPT | Performed by: INTERNAL MEDICINE

## 2018-06-27 NOTE — PATIENT INSTRUCTIONS
Rituximab injection  Brand Name: Emeline Denver  What is this medicine? RITUXIMAB (ri TUX i mab) is a monoclonal antibody. It is used to treat non-Hodgkin lymphoma and chronic lymphocytic leukemia. It is also used to treat rheumatoid arthritis (RA).  In RA, anibal · trouble passing urine or change in the amount of urine  Side effects that usually do not require medical attention (report to your doctor or health care professional if they continue or are bothersome):  · anxiety  · headache  · loss of appetite  · muscl Call your doctor or health care professional for advice if you get a fever, chills or sore throat, or other symptoms of a cold or flu. Do not treat yourself. This drug decreases your body's ability to fight infections.  Try to avoid being around people who Side effects that you should report to your doctor or health care professional as soon as possible:  · allergic reactions like skin rash, itching or hives, swelling of the face, lips, or tongue  · low blood counts - this medicine may decrease the number of This drug is given in a hospital or clinic and will not be stored at home. What should I tell my health care provider before I take this medicine?   They need to know if you have any of these conditions:  · infection (especially a virus infection such as c This medicine may interfere with the ability to have a child. You should talk with your doctor or health care professional if you are concerned about your fertility. NOTE:This sheet is a summary. It may not cover all possible information.  If you have ques

## 2018-06-27 NOTE — PROGRESS NOTES
HPI     Lev Price is a 39year old female here for f/u of Marginal zone lymphoma (hcc)  (primary encounter diagnosis)     Pt saw Dr Jaycee Reddy at [facility] for a second opinion (Dec 2015) who agreed on observation only for the time being.       Pt here today Mild stress incontinence with sneezing, coughing or laughing. Musculoskeletal: Positive for back pain (feels tight in the upper back, states always.  ) and gait problem (states has always been bad as she has walked in to walls. ).  Negative for ar Smokeless tobacco: Never Used    Alcohol use Yes  0.0 oz/week     Comment: a drinks a week, on weekends    Drug use: Yes     Other Topics Concern    Caffeine Concern Yes    Comment: 1 cup of coffee     Social History Narrative   None on file     Family Hi Musculoskeletal: She exhibits no edema or tenderness. Lymphadenopathy:        Head (right side): No submental, no submandibular, no tonsillar, no preauricular, no posterior auricular and no occipital adenopathy present.         Head (left side): No submen If she develops new GI symptoms, should have evaluation by GI with endoscopy to r/o involvement of the GI tract as this is a common site of involvement for marginal zone lymphoma.     Had BRCA testing 2011 for BRCA as family hx of ovarian ca x mom and grand and/or kV was done based on the patient's size. Iterative reconstruction technique for dose reduction was employed. Multiplanar reformats were created. Oral contrast was ingested. FINDINGS:          NASO/OROPHARYNX:  No mass or significant asymmetry. VASCULATURE:            The thoracic aorta is unremarkable in configuration without aneurysm or dissection. Aberrant retroesophageal right subclavian artery.   LUNG/PLEURA:             No airspace consolidation, pleural effusion, or pneumothorax is detected GI/MESENTERY:                        There is no evidence of bowel obstruction. The transverse colon is decompressed and suboptimally evaluated. Tiny hiatal hernia.  The appendix is not clearly delineated, but there are no suspicious inflammatory manifestat 2. Multifocal cutaneous/subcutaneous nodularity involving the left posterior neck and upper back is similar in configuration to prior. Borderline enlarged left level V cervical lymph node also appears unchanged.  These findings likely reflect lymphomatous GFR, -American      >=60 >60   Patient Fasting? Yes   WBC      4.0 - 11.0 K/UL 5.3   RBC      3.70 - 5.40 M/UL 4.06   Hemoglobin      12.0 - 16.0 g/dL 11.8 (L)   Hematocrit      35.0 - 48.0 % 35.0   MCV      80.0 - 100.0 fL 86.2   MCH      27.

## 2018-06-29 ENCOUNTER — TELEPHONE (OUTPATIENT)
Dept: HEMATOLOGY/ONCOLOGY | Facility: HOSPITAL | Age: 43
End: 2018-06-29

## 2018-06-29 NOTE — TELEPHONE ENCOUNTER
Pt called to clarify for authorization department if PET scan will be here at [facility] or another facility. Per pt it will be done at [facility]. Pt informed will call when authorized to schedule. Pt verbalizes understanding.

## 2018-07-10 ENCOUNTER — TELEPHONE (OUTPATIENT)
Dept: HEMATOLOGY/ONCOLOGY | Facility: HOSPITAL | Age: 43
End: 2018-07-10

## 2018-07-10 NOTE — TELEPHONE ENCOUNTER
Returned phone call and notified pt still working on the authorization of PET scan with insurance and will call when authorized. Pt will fax over [phone] [address] [place] paper work. Pt verbalizes understanding.

## 2018-07-10 NOTE — TELEPHONE ENCOUNTER
Leticia Gabriel was calling regarding 2 things, she will be faxing over [facility] paperwork that she would like to have completed prior to 7/24/18.  Then she was checking the status of her PET scan, please advise

## 2018-07-17 ENCOUNTER — TELEPHONE (OUTPATIENT)
Dept: HEMATOLOGY/ONCOLOGY | Facility: HOSPITAL | Age: 43
End: 2018-07-17

## 2018-07-17 NOTE — TELEPHONE ENCOUNTER
Peter Alcazar was calling checking on the status of her PET scan. I let her know its still pending under review. Her insurance company is asking for a peer to peer.  She said she would call them as well

## 2018-07-18 ENCOUNTER — TELEPHONE (OUTPATIENT)
Dept: HEMATOLOGY/ONCOLOGY | Facility: HOSPITAL | Age: 43
End: 2018-07-18

## 2018-07-18 NOTE — TELEPHONE ENCOUNTER
Kortney Yoder called to speak with Dr Wil Juarez concerning her PET SCAN that was denied by her insurance. Montserrat's  concern is, since the scan was denied, what's the next step moving forward.  Kortney Yoder can be reached at [phone] Please Advise Thanks

## 2018-07-19 ENCOUNTER — TELEPHONE (OUTPATIENT)
Dept: HEMATOLOGY/ONCOLOGY | Facility: HOSPITAL | Age: 43
End: 2018-07-19

## 2018-07-23 ENCOUNTER — TELEPHONE (OUTPATIENT)
Dept: HEMATOLOGY/ONCOLOGY | Facility: HOSPITAL | Age: 43
End: 2018-07-23

## 2018-07-23 NOTE — TELEPHONE ENCOUNTER
Pt called and left message that f/u appointment post PET scheduled 7/26/18 8:30am. Instructed to call and confirm [phone].

## 2018-07-24 ENCOUNTER — TELEPHONE (OUTPATIENT)
Dept: HEMATOLOGY/ONCOLOGY | Facility: HOSPITAL | Age: 43
End: 2018-07-24

## 2018-07-24 NOTE — TELEPHONE ENCOUNTER
Brittney Crooks called to speak with Juwan Lyn concerning results and when she could schedule an appointment after the results.  Brittney Crooks can be reached at [phone] Please Advise Thanks

## 2018-07-25 ENCOUNTER — HOSPITAL ENCOUNTER (OUTPATIENT)
Dept: NUCLEAR MEDICINE | Facility: HOSPITAL | Age: 43
Discharge: HOME | End: 2018-07-25
Attending: INTERNAL MEDICINE
Payer: COMMERCIAL

## 2018-07-25 DIAGNOSIS — C85.80 MARGINAL ZONE LYMPHOMA (HCC): ICD-10-CM

## 2018-07-25 LAB — GLUCOSE BLDC GLUCOMTR-MCNC: 107 MG/DL (ref 70–99)

## 2018-07-25 PROCEDURE — 82962 GLUCOSE BLOOD TEST: CPT

## 2018-07-25 PROCEDURE — 78816 PET IMAGE W/CT FULL BODY: CPT | Performed by: INTERNAL MEDICINE

## 2018-07-26 ENCOUNTER — OFFICE VISIT (OUTPATIENT)
Dept: HEMATOLOGY/ONCOLOGY | Facility: HOSPITAL | Age: 43
End: 2018-07-26
Attending: INTERNAL MEDICINE
Payer: COMMERCIAL

## 2018-07-26 VITALS
TEMPERATURE: 98 F | BODY MASS INDEX: 23.19 KG/M2 | RESPIRATION RATE: 16 BRPM | SYSTOLIC BLOOD PRESSURE: 121 MMHG | HEART RATE: 64 BPM | HEIGHT: 62 IN | WEIGHT: 126 LBS | DIASTOLIC BLOOD PRESSURE: 73 MMHG

## 2018-07-26 DIAGNOSIS — C85.80 MARGINAL ZONE LYMPHOMA (HCC): Primary | ICD-10-CM

## 2018-07-26 PROCEDURE — 99214 OFFICE O/P EST MOD 30 MIN: CPT | Performed by: INTERNAL MEDICINE

## 2018-07-26 NOTE — PROGRESS NOTES
VERÓNICA Hoffman is a 39year old female here for f/u of Marginal zone lymphoma (hcc)  (primary encounter diagnosis)     Pt saw Dr Claus Cartwright at [facility] for a second opinion (Dec 2015) who agreed on observation only for the time being.        Pt here today PHYSICAL EXAM:    /73 (BP Location: Left arm, Patient Position: Sitting, Cuff Size: adult)   Pulse 64   Temp 97.7 °F (36.5 °C) (Oral)   Resp 16   Ht 1.575 m (5' 2\")   Wt 57.2 kg (126 lb)   BMI 23.05 kg/m²      Wt Readings from Last 6 Encount hour(s))  -POCT GLUCOSE   Collection Time: 07/25/18  7:08 AM   Result Value Ref Range   POC Glucose  107 (H) 70 - 99      PROCEDURE:  PET/CT (NON-STAND) SCANS(SKULL TO TOES) (CPM=75188)     COMPARISON: [facility], [place] PET CT HEAD TO THIGH PF hypermetabolic. No pathological FDG activity. CHEST WALL/AXILLA:  A prominent left axillary lymph node is re-identified. It measures 9 x 11 mm (image 83), previously it measured 8 x 10 mm.   This has increased activity with an SUV max measuring 2.3, pr Stable right upper lobe pulmonary nodule continued followup as per the Fleischner society guidelines is recommended              Dictated by (CST): Cathy Murphy MD on 7/25/2018 at 12:44       Approved by (CST): Cathy Murphy MD on 7/25/2018 at 1

## 2018-07-30 ENCOUNTER — TELEPHONE (OUTPATIENT)
Dept: HEMATOLOGY/ONCOLOGY | Facility: HOSPITAL | Age: 43
End: 2018-07-30

## 2018-07-30 NOTE — TELEPHONE ENCOUNTER
Returned phone call. Per pt would like to start tx. Pt wondering if can wait till end of August or September. Informed would discuss with Dr. Russ Russo and call pt back. Pt verbalizes understanding.

## 2018-07-30 NOTE — TELEPHONE ENCOUNTER
Petros Ansari called to speak with the nurse concerning her treatment. Petros Ansari would like to move forward with treatment until may September.  Petros Ansari can be reached at [phone] Please Advise

## 2018-07-31 ENCOUNTER — TELEPHONE (OUTPATIENT)
Dept: HEMATOLOGY/ONCOLOGY | Facility: HOSPITAL | Age: 43
End: 2018-07-31

## 2018-07-31 NOTE — TELEPHONE ENCOUNTER
Pt called per Dr. Patrice Mendieta request and notified that it is ok to start treatment in August/September to to the tumor is slow growing. If symptoms worsen pt should start treatment sooner.  Pt instructed to call when wants to start treatment for an appointment f

## 2018-08-24 DIAGNOSIS — C85.80 MARGINAL ZONE LYMPHOMA (HCC): Primary | ICD-10-CM

## 2018-08-24 DIAGNOSIS — Z51.81 ENCOUNTER FOR MEDICATION MONITORING: ICD-10-CM

## 2018-08-30 ENCOUNTER — DOCUMENTATION ONLY (OUTPATIENT)
Dept: HEMATOLOGY/ONCOLOGY | Facility: HOSPITAL | Age: 43
End: 2018-08-30

## 2018-08-30 DIAGNOSIS — C85.80 MARGINAL ZONE LYMPHOMA (HCC): Primary | ICD-10-CM

## 2018-08-30 RX ORDER — PROCHLORPERAZINE MALEATE 10 MG
10 TABLET ORAL EVERY 8 HOURS PRN
Qty: 30 TABLET | Refills: 3 | Status: SHIPPED | OUTPATIENT
Start: 2018-08-30 | End: 2019-02-20

## 2018-08-30 RX ORDER — ONDANSETRON HYDROCHLORIDE 8 MG/1
8 TABLET, FILM COATED ORAL EVERY 8 HOURS PRN
Qty: 30 TABLET | Refills: 3 | Status: SHIPPED | OUTPATIENT
Start: 2018-08-30 | End: 2019-02-20

## 2018-08-30 NOTE — PATIENT INSTRUCTIONS
Medication Education Record: IV Therapy    Learner:  Patient    Barriers / Limitations:  None    Diagnosis:   Lymphoma    IV Cancer Treatment Name(s): Bendamustine, Rituximab  IV Cancer Treatment Frequency: Day 1 Rituximab and Bendamustine Day 2 Bendamusti (Zofran) 8 mg every 8 hours  Take as needed    Helpful hints during cancer treatment:    Diet:  o Avoid greasy or spicy foods on days surrounding chemotherapy  o Eat small frequent meals per day (6-7 meals) rather than 3 large meals  o Choose high calorie/ use an alcohol-free lotion twice per day, especially after baths.  o If scalp hair loss has occurred, protect the scalp from the sun by wearing a hat and use sunscreen. Apply alcohol-free moisturizer as needed. When to call the doctor:  • Fever of 100. family member is receiving chemotherapy, whether in the clinic or at home, the following precautions must be taken to lessen any exposure to the medication. Home Intravenous (IV) Medication:  1.  Make sure the connections of your IV tubing are tight and possible) they should not clean up any of your body fluids after you have treatment. 4. Sexual activity is safe while throughout treatment.   It is possible that traces of the oral chemotherapy may be present in vaginal fluid or semen for 48 hours after t

## 2018-08-31 ENCOUNTER — LABORATORY ENCOUNTER (OUTPATIENT)
Dept: HEMATOLOGY/ONCOLOGY | Facility: HOSPITAL | Age: 43
End: 2018-08-31
Attending: INTERNAL MEDICINE
Payer: COMMERCIAL

## 2018-08-31 VITALS
HEART RATE: 71 BPM | WEIGHT: 126 LBS | TEMPERATURE: 98 F | DIASTOLIC BLOOD PRESSURE: 77 MMHG | BODY MASS INDEX: 23.19 KG/M2 | HEIGHT: 62 IN | RESPIRATION RATE: 16 BRPM | SYSTOLIC BLOOD PRESSURE: 125 MMHG

## 2018-08-31 DIAGNOSIS — C85.80 MARGINAL ZONE LYMPHOMA (HCC): ICD-10-CM

## 2018-08-31 DIAGNOSIS — Z51.81 ENCOUNTER FOR MEDICATION MONITORING: ICD-10-CM

## 2018-08-31 DIAGNOSIS — C85.80 MARGINAL ZONE LYMPHOMA (HCC): Primary | ICD-10-CM

## 2018-08-31 LAB
ALBUMIN SERPL BCP-MCNC: 4 G/DL (ref 3.5–4.8)
ALBUMIN/GLOB SERPL: 1.9 {RATIO} (ref 1–2)
ALP SERPL-CCNC: 65 U/L (ref 32–100)
ALT SERPL-CCNC: 13 U/L (ref 14–54)
ANION GAP SERPL CALC-SCNC: 8 MMOL/L (ref 0–18)
AST SERPL-CCNC: 22 U/L (ref 15–41)
BASOPHILS # BLD: 0 K/UL (ref 0–0.2)
BASOPHILS NFR BLD: 0 %
BILIRUB SERPL-MCNC: 0.6 MG/DL (ref 0.3–1.2)
BUN SERPL-MCNC: 12 MG/DL (ref 8–20)
BUN/CREAT SERPL: 11.1 (ref 10–20)
CALCIUM SERPL-MCNC: 9.1 MG/DL (ref 8.5–10.5)
CHLORIDE SERPL-SCNC: 103 MMOL/L (ref 95–110)
CO2 SERPL-SCNC: 26 MMOL/L (ref 22–32)
CREAT SERPL-MCNC: 1.08 MG/DL (ref 0.5–1.5)
EOSINOPHIL # BLD: 0 K/UL (ref 0–0.7)
EOSINOPHIL NFR BLD: 1 %
ERYTHROCYTE [DISTWIDTH] IN BLOOD BY AUTOMATED COUNT: 15.2 % (ref 11–15)
GLOBULIN PLAS-MCNC: 2.1 G/DL (ref 2.5–3.7)
GLUCOSE SERPL-MCNC: 118 MG/DL (ref 70–99)
HCT VFR BLD AUTO: 36.4 % (ref 35–48)
HGB BLD-MCNC: 12.4 G/DL (ref 12–16)
LYMPHOCYTES # BLD: 1.8 K/UL (ref 1–4)
LYMPHOCYTES NFR BLD: 35 %
MCH RBC QN AUTO: 30.2 PG (ref 27–32)
MCHC RBC AUTO-ENTMCNC: 34 G/DL (ref 32–37)
MCV RBC AUTO: 89 FL (ref 80–100)
MONOCYTES # BLD: 0.3 K/UL (ref 0–1)
MONOCYTES NFR BLD: 6 %
NEUTROPHILS # BLD AUTO: 2.9 K/UL (ref 1.8–7.7)
NEUTROPHILS NFR BLD: 57 %
OSMOLALITY UR CALC.SUM OF ELEC: 285 MOSM/KG (ref 275–295)
PATIENT FASTING: NO
PLATELET # BLD AUTO: 94 K/UL (ref 140–400)
PMV BLD AUTO: 8.9 FL (ref 7.4–10.3)
POTASSIUM SERPL-SCNC: 4 MMOL/L (ref 3.3–5.1)
PROT SERPL-MCNC: 6.1 G/DL (ref 5.9–8.4)
RBC # BLD AUTO: 4.09 M/UL (ref 3.7–5.4)
SODIUM SERPL-SCNC: 137 MMOL/L (ref 136–144)
WBC # BLD AUTO: 5.1 K/UL (ref 4–11)

## 2018-08-31 PROCEDURE — 85025 COMPLETE CBC W/AUTO DIFF WBC: CPT

## 2018-08-31 PROCEDURE — 86706 HEP B SURFACE ANTIBODY: CPT

## 2018-08-31 PROCEDURE — 86704 HEP B CORE ANTIBODY TOTAL: CPT

## 2018-08-31 PROCEDURE — 36415 COLL VENOUS BLD VENIPUNCTURE: CPT

## 2018-08-31 PROCEDURE — 80500 HEPATITIS B PROFILE: CPT

## 2018-08-31 PROCEDURE — 87340 HEPATITIS B SURFACE AG IA: CPT

## 2018-08-31 PROCEDURE — 80053 COMPREHEN METABOLIC PANEL: CPT

## 2018-08-31 PROCEDURE — 99215 OFFICE O/P EST HI 40 MIN: CPT | Performed by: NURSE PRACTITIONER

## 2018-08-31 RX ORDER — PROCHLORPERAZINE MALEATE 10 MG
10 TABLET ORAL EVERY 6 HOURS PRN
Status: CANCELLED | OUTPATIENT
Start: 2018-09-07

## 2018-08-31 RX ORDER — METOCLOPRAMIDE HYDROCHLORIDE 5 MG/ML
10 INJECTION INTRAMUSCULAR; INTRAVENOUS EVERY 6 HOURS PRN
Status: CANCELLED | OUTPATIENT
Start: 2018-09-07

## 2018-08-31 RX ORDER — LORAZEPAM 1 MG/1
TABLET ORAL EVERY 4 HOURS PRN
Status: CANCELLED | OUTPATIENT
Start: 2018-08-31

## 2018-08-31 RX ORDER — METOCLOPRAMIDE HYDROCHLORIDE 5 MG/ML
10 INJECTION INTRAMUSCULAR; INTRAVENOUS EVERY 6 HOURS PRN
Status: CANCELLED | OUTPATIENT
Start: 2018-08-31

## 2018-08-31 RX ORDER — METRONIDAZOLE 500 MG/1
TABLET ORAL
COMMUNITY
End: 2019-08-20

## 2018-08-31 RX ORDER — ONDANSETRON 2 MG/ML
8 INJECTION INTRAMUSCULAR; INTRAVENOUS EVERY 6 HOURS PRN
Status: CANCELLED | OUTPATIENT
Start: 2018-08-31

## 2018-08-31 RX ORDER — PROCHLORPERAZINE MALEATE 10 MG
10 TABLET ORAL EVERY 6 HOURS PRN
Status: CANCELLED | OUTPATIENT
Start: 2018-08-31

## 2018-08-31 RX ORDER — ACYCLOVIR 400 MG/1
400 TABLET ORAL 2 TIMES DAILY
Qty: 60 TABLET | Refills: 3 | Status: SHIPPED | OUTPATIENT
Start: 2018-08-31 | End: 2019-01-13

## 2018-08-31 RX ORDER — ONDANSETRON 2 MG/ML
8 INJECTION INTRAMUSCULAR; INTRAVENOUS EVERY 6 HOURS PRN
Status: CANCELLED | OUTPATIENT
Start: 2018-09-07

## 2018-08-31 RX ORDER — LORAZEPAM 1 MG/1
TABLET ORAL EVERY 4 HOURS PRN
Status: CANCELLED | OUTPATIENT
Start: 2018-09-07

## 2018-08-31 RX ORDER — ACETAMINOPHEN 325 MG/1
650 TABLET ORAL ONCE
Status: CANCELLED | OUTPATIENT
Start: 2018-08-31

## 2018-08-31 RX ORDER — VALACYCLOVIR HYDROCHLORIDE 500 MG/1
TABLET, FILM COATED ORAL
COMMUNITY
End: 2018-10-02 | Stop reason: ALTCHOICE

## 2018-08-31 RX ORDER — LORAZEPAM 2 MG/ML
INJECTION INTRAMUSCULAR EVERY 4 HOURS PRN
Status: CANCELLED | OUTPATIENT
Start: 2018-08-31

## 2018-08-31 RX ORDER — DIPHENHYDRAMINE HCL 50 MG
50 CAPSULE ORAL ONCE
Status: CANCELLED | OUTPATIENT
Start: 2018-08-31

## 2018-08-31 RX ORDER — LORAZEPAM 2 MG/ML
INJECTION INTRAMUSCULAR EVERY 4 HOURS PRN
Status: CANCELLED | OUTPATIENT
Start: 2018-09-07

## 2018-08-31 NOTE — PATIENT INSTRUCTIONS
Medication Education Record: IV Therapy     Learner:  Patient     Barriers / Limitations:  None     Diagnosis:   Lymphoma     IV Cancer Treatment Name(s): Bendamustine, Rituximab  IV Cancer Treatment Frequency: Day 1 Rituximab and Bendamustine Day 2 Bendam Ondansetron (Zofran) 8 mg every 8 hours  Take as needed     Helpful hints during cancer treatment:                  Diet:  ? Avoid greasy or spicy foods on days surrounding chemotherapy  ?  Eat small frequent meals per day (6-7 meals) rather than 3 large me bathing or sweating. · For dry skin, use an alcohol-free lotion twice per day, especially after baths. · If scalp hair loss has occurred, protect the scalp from the sun by wearing a hat and use sunscreen.   Apply alcohol-free moisturizer as needed.     Wh Handling of Chemotherapy  While you or your family member is receiving chemotherapy, whether in the clinic or at home, the following precautions must be taken to lessen any exposure to the medication. Handling Body Waste:   1.  Caregivers must wear glov Chemotherapy can have harmful side effects to the fetus, especially in the first trimester.   In addition, menstrual cycles can become irregular during and after treatment, so you may not know if you are at a time in your cycle when you could become pregnan

## 2018-08-31 NOTE — PROGRESS NOTES
HPI     Elidia Zhu is a 39year old female here for f/u of Marginal zone lymphoma (hcc)  (primary encounter diagnosis)     Here today for chemotherapy education. Denies pain or discomfort. Performance status 0.      Review of Systems:     Revi Relation Age of Onset   • Hypertension Father    • Diabetes Father    • Cancer Mother      Ovarian- passed away at 46   • Cancer Maternal Grandmother      ovarian cancer   • Cancer Maternal Grandfather      lung cancer         PHYSICAL EXAM:    There were results found for this or any previous visit (from the past 50 hour(s)).         Medication Education Record: IV Therapy     Learner:  Patient     Barriers / Limitations:  None     Diagnosis:   Lymphoma     IV Cancer Treatment Name(s): Bendamustine, Rituxim provider):  Prochloperazine (Compazine) 10mg every 8 hours  Take as needed and Ondansetron (Zofran) 8 mg every 8 hours  Take as needed     Helpful hints during cancer treatment:                  Diet:  ?  Avoid greasy or spicy foods on days surrounding chem skin exposed to the sun. Re-apply every 2 hours if in the sun and after bathing or sweating. · For dry skin, use an alcohol-free lotion twice per day, especially after baths.   · If scalp hair loss has occurred, protect the scalp from the sun by wearing a members: http://www.sinclair.info/. html           Safety and Handling of Chemotherapy  While you or your family member is receiving chemotherapy, whether in the clinic or at home, the following precautions must be taken to lessen any exp while on these medications and for several months or years after therapy. Chemotherapy can have harmful side effects to the fetus, especially in the first trimester.   In addition, menstrual cycles can become irregular during and after treatment, so you ma

## 2018-09-04 ENCOUNTER — TELEPHONE (OUTPATIENT)
Dept: HEMATOLOGY/ONCOLOGY | Facility: HOSPITAL | Age: 43
End: 2018-09-04

## 2018-09-04 LAB
HBV CORE AB SERPL QL IA: NONREACTIVE
HBV SURFACE AB SER-ACNC: <3.1 MIU/ML (ref ?–10)
HBV SURFACE AG SERPL QL IA: NONREACTIVE
HBV SURFACE AG SERPL QL IA: NONREACTIVE

## 2018-09-04 NOTE — TELEPHONE ENCOUNTER
Tish Ballard has questions as to when she should start her Acylovir, she has already picked it up from the pharmacy but does not know when she should start, please advise

## 2018-09-04 NOTE — TELEPHONE ENCOUNTER
Spoke with Yee Proctor from [facility] infusion and pt upset about appointment times on Thursday and Friday. Pt only available to come at 9:30 am.      Pt called and pt crying on phone.  Per pt only has availability to be driven by sister at 7:92 am due to has

## 2018-09-05 ENCOUNTER — TELEPHONE (OUTPATIENT)
Dept: HEMATOLOGY/ONCOLOGY | Facility: HOSPITAL | Age: 43
End: 2018-09-05

## 2018-09-05 NOTE — TELEPHONE ENCOUNTER
Camron Richmond is calling to speak with Cristiane Taylor again this morning, she said she may have a ride now for her appointments, please call when available.

## 2018-09-05 NOTE — TELEPHONE ENCOUNTER
Returned phone call and per pt able to work the ride to treatments worked out. Pt having a friend drop her off.

## 2018-09-06 ENCOUNTER — OFFICE VISIT (OUTPATIENT)
Dept: HEMATOLOGY/ONCOLOGY | Facility: HOSPITAL | Age: 43
End: 2018-09-06
Attending: INTERNAL MEDICINE
Payer: COMMERCIAL

## 2018-09-06 VITALS
DIASTOLIC BLOOD PRESSURE: 60 MMHG | HEIGHT: 62 IN | BODY MASS INDEX: 22.45 KG/M2 | RESPIRATION RATE: 16 BRPM | WEIGHT: 122 LBS | HEART RATE: 68 BPM | TEMPERATURE: 99 F | SYSTOLIC BLOOD PRESSURE: 104 MMHG

## 2018-09-06 DIAGNOSIS — C85.80 MARGINAL ZONE LYMPHOMA (HCC): Primary | ICD-10-CM

## 2018-09-06 PROCEDURE — 96415 CHEMO IV INFUSION ADDL HR: CPT

## 2018-09-06 PROCEDURE — 96367 TX/PROPH/DG ADDL SEQ IV INF: CPT

## 2018-09-06 PROCEDURE — 96411 CHEMO IV PUSH ADDL DRUG: CPT

## 2018-09-06 PROCEDURE — 96413 CHEMO IV INFUSION 1 HR: CPT

## 2018-09-06 PROCEDURE — A4216 STERILE WATER/SALINE, 10 ML: HCPCS

## 2018-09-06 RX ORDER — ACETAMINOPHEN 325 MG/1
650 TABLET ORAL ONCE
Status: COMPLETED | OUTPATIENT
Start: 2018-09-06 | End: 2018-09-06

## 2018-09-06 RX ORDER — DIPHENHYDRAMINE HCL 50 MG
50 CAPSULE ORAL ONCE
Status: COMPLETED | OUTPATIENT
Start: 2018-09-06 | End: 2018-09-06

## 2018-09-06 RX ORDER — 0.9 % SODIUM CHLORIDE 0.9 %
VIAL (ML) INJECTION
Status: DISCONTINUED
Start: 2018-09-06 | End: 2018-09-06

## 2018-09-06 RX ORDER — DIPHENHYDRAMINE HCL 25 MG
CAPSULE ORAL
Status: DISCONTINUED
Start: 2018-09-06 | End: 2018-09-06 | Stop reason: WASHOUT

## 2018-09-06 RX ORDER — ACETAMINOPHEN 325 MG/1
TABLET ORAL
Status: COMPLETED
Start: 2018-09-06 | End: 2018-09-06

## 2018-09-06 RX ORDER — DIPHENHYDRAMINE HCL 50 MG
CAPSULE ORAL
Status: COMPLETED
Start: 2018-09-06 | End: 2018-09-06

## 2018-09-06 RX ORDER — SODIUM CHLORIDE 9 MG/ML
INJECTION, SOLUTION INTRAVENOUS
Status: DISCONTINUED
Start: 2018-09-06 | End: 2018-09-06

## 2018-09-06 RX ADMIN — DIPHENHYDRAMINE HCL 50 MG: 50 MG CAPSULE ORAL at 08:52:00

## 2018-09-06 RX ADMIN — ACETAMINOPHEN 650 MG: 325 TABLET ORAL at 08:50:00

## 2018-09-06 NOTE — PATIENT INSTRUCTIONS
Anti-nausea medications:  1. Compazine/Prochlorperazime Maleate 10mg- take one tablet every 8 hours as needed. Please take this medication 1st and continue to use for 2-3 days. If nausea is unrelieved, then take Zofran.  If taking both medications, p try dry foods, such as toast, crackers or pretzels; light or bland foods, such as applesauce or oatmeal.  Fluid intake:  · Drink 8-10 cups of liquid a day and take a water bottle wherever you go.  Any fluid is acceptable, but caffeinated products do not co medications: Unable to drink for 24 hours or have signs of dehydration: tiredness, thirst, dry mouth, dark and decreased amount of urine  · Diarrhea – not controlled with Imodium AD or more than 6 episodes in 24 hours  · Constipation –no bowel movement x 3 the last dose. 5. Wash your hands after using the toilet. 6. Wash any skin area that has come in contact with urine or stool. Safety for my family and friends:  1.  Due to safety concerns and the nature of this treatment environment, children are not p

## 2018-09-06 NOTE — PROGRESS NOTES
Montserrat to infusion today  for C1 D1 Rituxan/Bendeka. Arrives Ambulating independently, accompanied by sister. She is feeling well, very anxious and nervous. Denies fevers or flu-like symptoms.            Lab Results  Component Value Date   WBC 5.1 08/31/20 and Family Member/sister  Barriers / Limitations:  None  Method:  Brief focused, Discussion and Printed material  Outcome:  Needs reinforcement and Shows understanding  Comments: AVS printed and reviewed with patient and her sister.  Patient refused need fo

## 2018-09-07 ENCOUNTER — TELEPHONE (OUTPATIENT)
Dept: HEMATOLOGY/ONCOLOGY | Facility: HOSPITAL | Age: 43
End: 2018-09-07

## 2018-09-07 ENCOUNTER — OFFICE VISIT (OUTPATIENT)
Dept: HEMATOLOGY/ONCOLOGY | Facility: HOSPITAL | Age: 43
End: 2018-09-07
Attending: INTERNAL MEDICINE
Payer: COMMERCIAL

## 2018-09-07 VITALS
DIASTOLIC BLOOD PRESSURE: 42 MMHG | RESPIRATION RATE: 16 BRPM | SYSTOLIC BLOOD PRESSURE: 115 MMHG | HEART RATE: 45 BPM | TEMPERATURE: 98 F

## 2018-09-07 DIAGNOSIS — C85.80 MARGINAL ZONE LYMPHOMA (HCC): Primary | ICD-10-CM

## 2018-09-07 PROCEDURE — 96409 CHEMO IV PUSH SNGL DRUG: CPT

## 2018-09-07 PROCEDURE — 96375 TX/PRO/DX INJ NEW DRUG ADDON: CPT

## 2018-09-07 PROCEDURE — A4216 STERILE WATER/SALINE, 10 ML: HCPCS

## 2018-09-07 RX ORDER — SODIUM CHLORIDE 9 MG/ML
INJECTION, SOLUTION INTRAVENOUS
Status: DISCONTINUED
Start: 2018-09-07 | End: 2018-09-07

## 2018-09-07 RX ORDER — 0.9 % SODIUM CHLORIDE 0.9 %
VIAL (ML) INJECTION
Status: DISCONTINUED
Start: 2018-09-07 | End: 2018-09-07

## 2018-09-07 NOTE — TELEPHONE ENCOUNTER
Pt called started Tx Bendamustine and Rituximab 9/6/18. Pt states \" I feel really good. The only thing is I did not have a bowel movement this morning but could be that I have not been eating very good lately and having fruits and vegetables. \" Discussed

## 2018-09-07 NOTE — TELEPHONE ENCOUNTER
Aixa Alcazar calling asking if she can use for hair color [employer] no ammonia plant based. Please advise.  maría

## 2018-09-07 NOTE — PROGRESS NOTES
Pt here for C1D2 Bendeka. Arrives Ambulating independently  Modifications in dose or schedule: No    Patient reports she is feeling really well, denies any complaints. Patient states she feels like her lyphm nodes have already decreased in size.       PIV

## 2018-09-18 ENCOUNTER — TELEPHONE (OUTPATIENT)
Dept: HEMATOLOGY/ONCOLOGY | Facility: HOSPITAL | Age: 43
End: 2018-09-18

## 2018-09-18 NOTE — TELEPHONE ENCOUNTER
Returned phone call. Per pt the shellfish question is separate from the itching of the ears. Pt denies itching when eating shellfish. Pt instructed may eat shellfish if no itching or allergy.  Informed may try benadryl over the counter as needed for ear itc

## 2018-09-18 NOTE — TELEPHONE ENCOUNTER
Phyllis Olivares is calling with two questions, first she wants to know if she can eat shellfish. Then lastly she said sometimes her ears itch on and off, they are not itching today. Can she take anything for this?  Please advise

## 2018-10-03 ENCOUNTER — LABORATORY ENCOUNTER (OUTPATIENT)
Dept: HEMATOLOGY/ONCOLOGY | Facility: HOSPITAL | Age: 43
End: 2018-10-03
Attending: INTERNAL MEDICINE
Payer: COMMERCIAL

## 2018-10-03 VITALS
HEIGHT: 62 IN | RESPIRATION RATE: 16 BRPM | SYSTOLIC BLOOD PRESSURE: 110 MMHG | DIASTOLIC BLOOD PRESSURE: 74 MMHG | TEMPERATURE: 98 F | BODY MASS INDEX: 22.08 KG/M2 | WEIGHT: 120 LBS | HEART RATE: 66 BPM

## 2018-10-03 DIAGNOSIS — Z09 CHEMOTHERAPY FOLLOW-UP EXAMINATION: ICD-10-CM

## 2018-10-03 DIAGNOSIS — C85.80 MARGINAL ZONE LYMPHOMA (HCC): Primary | ICD-10-CM

## 2018-10-03 PROCEDURE — 85025 COMPLETE CBC W/AUTO DIFF WBC: CPT

## 2018-10-03 PROCEDURE — 99215 OFFICE O/P EST HI 40 MIN: CPT | Performed by: NURSE PRACTITIONER

## 2018-10-03 PROCEDURE — 80053 COMPREHEN METABOLIC PANEL: CPT

## 2018-10-03 RX ORDER — METOCLOPRAMIDE HYDROCHLORIDE 5 MG/ML
10 INJECTION INTRAMUSCULAR; INTRAVENOUS EVERY 6 HOURS PRN
Status: CANCELLED | OUTPATIENT
Start: 2018-10-04

## 2018-10-03 RX ORDER — ACETAMINOPHEN 325 MG/1
650 TABLET ORAL ONCE
Status: CANCELLED | OUTPATIENT
Start: 2018-10-04

## 2018-10-03 RX ORDER — DIPHENHYDRAMINE HCL 50 MG
50 CAPSULE ORAL ONCE
Status: CANCELLED | OUTPATIENT
Start: 2018-10-04

## 2018-10-03 RX ORDER — PROCHLORPERAZINE MALEATE 10 MG
10 TABLET ORAL EVERY 6 HOURS PRN
Status: CANCELLED | OUTPATIENT
Start: 2018-10-04

## 2018-10-03 RX ORDER — LORAZEPAM 1 MG/1
TABLET ORAL EVERY 4 HOURS PRN
Status: CANCELLED | OUTPATIENT
Start: 2018-10-04

## 2018-10-03 RX ORDER — LORAZEPAM 2 MG/ML
INJECTION INTRAMUSCULAR EVERY 4 HOURS PRN
Status: CANCELLED | OUTPATIENT
Start: 2018-10-05

## 2018-10-03 RX ORDER — PROCHLORPERAZINE MALEATE 10 MG
10 TABLET ORAL EVERY 6 HOURS PRN
Status: CANCELLED | OUTPATIENT
Start: 2018-10-05

## 2018-10-03 RX ORDER — LORAZEPAM 1 MG/1
TABLET ORAL EVERY 4 HOURS PRN
Status: CANCELLED | OUTPATIENT
Start: 2018-10-05

## 2018-10-03 RX ORDER — ONDANSETRON 2 MG/ML
8 INJECTION INTRAMUSCULAR; INTRAVENOUS EVERY 6 HOURS PRN
Status: CANCELLED | OUTPATIENT
Start: 2018-10-04

## 2018-10-03 RX ORDER — LORAZEPAM 2 MG/ML
INJECTION INTRAMUSCULAR EVERY 4 HOURS PRN
Status: CANCELLED | OUTPATIENT
Start: 2018-10-04

## 2018-10-03 RX ORDER — METOCLOPRAMIDE HYDROCHLORIDE 5 MG/ML
10 INJECTION INTRAMUSCULAR; INTRAVENOUS EVERY 6 HOURS PRN
Status: CANCELLED | OUTPATIENT
Start: 2018-10-05

## 2018-10-03 RX ORDER — ONDANSETRON 2 MG/ML
8 INJECTION INTRAMUSCULAR; INTRAVENOUS EVERY 6 HOURS PRN
Status: CANCELLED | OUTPATIENT
Start: 2018-10-05

## 2018-10-03 NOTE — PROGRESS NOTES
VERÓNICA     Feliberto Chaidez is a 39year old female here for f/u of Marginal zone lymphoma (hcc)  (primary encounter diagnosis)  Chemotherapy follow-up examination     Here today for follow up s/p Cycle 1 Bendamustine Rituxan.  Some fatigue week after treatmen Neurological: Positive for numbness (since yesterday, numbness and tingling in the finger tips a little. ). Negative for dizziness, weakness, light-headedness and headaches.         Has had vertigo   Hematological: Positive for adenopathy (skin neck and R Years of education: Not on file      Highest education level: Not on file    Social Needs      Financial resource strain: Not on file      Food insecurity - worry: Not on file      Food insecurity - inability: Not on file      Transportation needs - me Constitutional: She is oriented to person, place, and time. She appears well-developed and well-nourished. No distress. HENT:   Head: Normocephalic and atraumatic. Mouth/Throat: Oropharynx is clear and moist. No oropharyngeal exudate.    Eyes: Conjuncti Neurological: She is alert and oriented to person, place, and time. She has normal reflexes. No cranial nerve deficit. Skin: Skin is warm and dry. No rash noted. She is not diaphoretic. No erythema. No pallor.         Psychiatric: She has a normal mood an Glucose 88 70 - 99 mg/dL    Sodium 138 136 - 144 mmol/L    Potassium 4.3 3.3 - 5.1 mmol/L    Chloride 106 95 - 110 mmol/L    CO2 26 22 - 32 mmol/L    BUN 11 8 - 20 mg/dL    Creatinine 0.86 0.50 - 1.50 mg/dL    Calcium, Total 9.4 8.5 - 10.5 mg/dL    ALT 12

## 2018-10-04 ENCOUNTER — OFFICE VISIT (OUTPATIENT)
Dept: HEMATOLOGY/ONCOLOGY | Facility: HOSPITAL | Age: 43
End: 2018-10-04
Attending: INTERNAL MEDICINE
Payer: COMMERCIAL

## 2018-10-04 VITALS
HEART RATE: 64 BPM | SYSTOLIC BLOOD PRESSURE: 91 MMHG | DIASTOLIC BLOOD PRESSURE: 48 MMHG | TEMPERATURE: 98 F | RESPIRATION RATE: 16 BRPM

## 2018-10-04 DIAGNOSIS — C85.80 MARGINAL ZONE LYMPHOMA (HCC): Primary | ICD-10-CM

## 2018-10-04 PROCEDURE — 96411 CHEMO IV PUSH ADDL DRUG: CPT

## 2018-10-04 PROCEDURE — 96415 CHEMO IV INFUSION ADDL HR: CPT

## 2018-10-04 PROCEDURE — 96375 TX/PRO/DX INJ NEW DRUG ADDON: CPT

## 2018-10-04 PROCEDURE — 96413 CHEMO IV INFUSION 1 HR: CPT

## 2018-10-04 RX ORDER — ACETAMINOPHEN 325 MG/1
650 TABLET ORAL ONCE
Status: COMPLETED | OUTPATIENT
Start: 2018-10-04 | End: 2018-10-04

## 2018-10-04 RX ORDER — DIPHENHYDRAMINE HCL 50 MG
50 CAPSULE ORAL ONCE
Status: COMPLETED | OUTPATIENT
Start: 2018-10-04 | End: 2018-10-04

## 2018-10-04 RX ADMIN — DIPHENHYDRAMINE HCL 50 MG: 50 MG CAPSULE ORAL at 07:56:00

## 2018-10-04 RX ADMIN — ACETAMINOPHEN 650 MG: 325 TABLET ORAL at 07:55:00

## 2018-10-05 ENCOUNTER — OFFICE VISIT (OUTPATIENT)
Dept: HEMATOLOGY/ONCOLOGY | Facility: HOSPITAL | Age: 43
End: 2018-10-05
Attending: INTERNAL MEDICINE
Payer: COMMERCIAL

## 2018-10-05 VITALS
DIASTOLIC BLOOD PRESSURE: 55 MMHG | RESPIRATION RATE: 16 BRPM | SYSTOLIC BLOOD PRESSURE: 110 MMHG | TEMPERATURE: 98 F | HEART RATE: 70 BPM

## 2018-10-05 DIAGNOSIS — C85.80 MARGINAL ZONE LYMPHOMA (HCC): Primary | ICD-10-CM

## 2018-10-05 PROCEDURE — A4216 STERILE WATER/SALINE, 10 ML: HCPCS

## 2018-10-05 PROCEDURE — 96367 TX/PROPH/DG ADDL SEQ IV INF: CPT

## 2018-10-05 PROCEDURE — 96409 CHEMO IV PUSH SNGL DRUG: CPT

## 2018-10-05 RX ORDER — 0.9 % SODIUM CHLORIDE 0.9 %
VIAL (ML) INJECTION
Status: DISCONTINUED
Start: 2018-10-05 | End: 2018-10-05

## 2018-10-05 RX ORDER — SODIUM CHLORIDE 9 MG/ML
INJECTION, SOLUTION INTRAVENOUS
Status: DISCONTINUED
Start: 2018-10-05 | End: 2018-10-05

## 2018-10-05 NOTE — PROGRESS NOTES
Pt here for Loman Rinne. Arrives Ambulating independently  Modifications in dose or schedule: No    Patient reports she is feeling really well, denies any complaints. PIV started in right AC, positive blood return noted.  Frequency of blood return and

## 2018-10-16 ENCOUNTER — APPOINTMENT (OUTPATIENT)
Dept: HEMATOLOGY/ONCOLOGY | Facility: HOSPITAL | Age: 43
End: 2018-10-16
Attending: INTERNAL MEDICINE
Payer: COMMERCIAL

## 2018-10-30 ENCOUNTER — OFFICE VISIT (OUTPATIENT)
Dept: HEMATOLOGY/ONCOLOGY | Facility: HOSPITAL | Age: 43
End: 2018-10-30
Attending: INTERNAL MEDICINE
Payer: COMMERCIAL

## 2018-10-30 VITALS
HEIGHT: 62 IN | WEIGHT: 122 LBS | DIASTOLIC BLOOD PRESSURE: 67 MMHG | SYSTOLIC BLOOD PRESSURE: 118 MMHG | BODY MASS INDEX: 22.45 KG/M2 | TEMPERATURE: 98 F | RESPIRATION RATE: 16 BRPM | HEART RATE: 57 BPM

## 2018-10-30 DIAGNOSIS — C85.80 MARGINAL ZONE LYMPHOMA (HCC): Primary | ICD-10-CM

## 2018-10-30 DIAGNOSIS — Z09 CHEMOTHERAPY FOLLOW-UP EXAMINATION: ICD-10-CM

## 2018-10-30 DIAGNOSIS — C85.80 MARGINAL ZONE LYMPHOMA (HCC): ICD-10-CM

## 2018-10-30 PROCEDURE — 80053 COMPREHEN METABOLIC PANEL: CPT

## 2018-10-30 PROCEDURE — 99215 OFFICE O/P EST HI 40 MIN: CPT | Performed by: NURSE PRACTITIONER

## 2018-10-30 PROCEDURE — 36415 COLL VENOUS BLD VENIPUNCTURE: CPT

## 2018-10-30 PROCEDURE — 85025 COMPLETE CBC W/AUTO DIFF WBC: CPT

## 2018-10-30 RX ORDER — ACETAMINOPHEN 325 MG/1
650 TABLET ORAL ONCE
Status: CANCELLED | OUTPATIENT
Start: 2018-11-01

## 2018-10-30 RX ORDER — LORAZEPAM 1 MG/1
TABLET ORAL EVERY 4 HOURS PRN
Status: CANCELLED | OUTPATIENT
Start: 2018-11-01

## 2018-10-30 RX ORDER — LORAZEPAM 1 MG/1
TABLET ORAL EVERY 4 HOURS PRN
Status: CANCELLED | OUTPATIENT
Start: 2018-11-02

## 2018-10-30 RX ORDER — LORAZEPAM 2 MG/ML
INJECTION INTRAMUSCULAR EVERY 4 HOURS PRN
Status: CANCELLED | OUTPATIENT
Start: 2018-11-01

## 2018-10-30 RX ORDER — METOCLOPRAMIDE HYDROCHLORIDE 5 MG/ML
10 INJECTION INTRAMUSCULAR; INTRAVENOUS EVERY 6 HOURS PRN
Status: CANCELLED | OUTPATIENT
Start: 2018-11-02

## 2018-10-30 RX ORDER — DIPHENHYDRAMINE HCL 50 MG
50 CAPSULE ORAL ONCE
Status: CANCELLED | OUTPATIENT
Start: 2018-11-01

## 2018-10-30 RX ORDER — PROCHLORPERAZINE MALEATE 10 MG
10 TABLET ORAL EVERY 6 HOURS PRN
Status: CANCELLED | OUTPATIENT
Start: 2018-11-01

## 2018-10-30 RX ORDER — ONDANSETRON 2 MG/ML
8 INJECTION INTRAMUSCULAR; INTRAVENOUS EVERY 6 HOURS PRN
Status: CANCELLED | OUTPATIENT
Start: 2018-11-02

## 2018-10-30 RX ORDER — LORAZEPAM 2 MG/ML
INJECTION INTRAMUSCULAR EVERY 4 HOURS PRN
Status: CANCELLED | OUTPATIENT
Start: 2018-11-02

## 2018-10-30 RX ORDER — METOCLOPRAMIDE HYDROCHLORIDE 5 MG/ML
10 INJECTION INTRAMUSCULAR; INTRAVENOUS EVERY 6 HOURS PRN
Status: CANCELLED | OUTPATIENT
Start: 2018-11-01

## 2018-10-30 RX ORDER — ONDANSETRON 2 MG/ML
8 INJECTION INTRAMUSCULAR; INTRAVENOUS EVERY 6 HOURS PRN
Status: CANCELLED | OUTPATIENT
Start: 2018-11-01

## 2018-10-30 RX ORDER — PROCHLORPERAZINE MALEATE 10 MG
10 TABLET ORAL EVERY 6 HOURS PRN
Status: CANCELLED | OUTPATIENT
Start: 2018-11-02

## 2018-10-30 NOTE — PROGRESS NOTES
HPI     Vida Iniguez is a 39year old female here for f/u of Marginal zone lymphoma (hcc)  Chemotherapy follow-up examination     Here today for follow up s/p Cycle 2 Bendamustine Rituxan. Some fatigue week after treatment.    LMP 9/29 and 10/14, 2 cristian Hematological: Positive for adenopathy (skin neck and R upper arm improved). Bruises/bleeds easily. Psychiatric/Behavioral: Negative for sleep disturbance. The patient is not nervous/anxious. Denies depression. Mood changes at time.           Cur Food insecurity - inability: Not on file      Transportation needs - medical: Not on file      Transportation needs - non-medical: Not on file    Occupational History      Not on file    Tobacco Use      Smoking status: Never Smoker      Smokeless toba Mouth/Throat: Oropharynx is clear and moist. No oropharyngeal exudate. Eyes: Conjunctivae and EOM are normal. Pupils are equal, round, and reactive to light. No scleral icterus. Neck: Normal range of motion. Neck supple. No JVD present.  No tracheal dev Psychiatric: She has a normal mood and affect. Her behavior is normal.   Vitals reviewed.   Left neck lymphedema decreased, deep palpation 1 cm thickening noted        ASSESSMENT/PLAN:   Marginal zone lymphoma (hcc)  Chemotherapy follow-up examination    Pa Creatinine 0.85 0.50 - 1.50 mg/dL    Calcium, Total 9.4 8.5 - 10.5 mg/dL    ALT 14 14 - 54 U/L    AST 17 15 - 41 U/L    Alkaline Phosphatase 57 32 - 100 U/L    Bilirubin, Total 1.5 (H) 0.3 - 1.2 mg/dL    Total Protein 6.3 5.9 - 8.4 g/dL    Albumin 4.1 3.5

## 2018-10-31 ENCOUNTER — APPOINTMENT (OUTPATIENT)
Dept: HEMATOLOGY/ONCOLOGY | Facility: HOSPITAL | Age: 43
End: 2018-10-31
Attending: INTERNAL MEDICINE
Payer: COMMERCIAL

## 2018-11-01 ENCOUNTER — OFFICE VISIT (OUTPATIENT)
Dept: HEMATOLOGY/ONCOLOGY | Facility: HOSPITAL | Age: 43
End: 2018-11-01
Attending: INTERNAL MEDICINE
Payer: COMMERCIAL

## 2018-11-01 VITALS
RESPIRATION RATE: 16 BRPM | DIASTOLIC BLOOD PRESSURE: 69 MMHG | TEMPERATURE: 97 F | SYSTOLIC BLOOD PRESSURE: 105 MMHG | HEART RATE: 68 BPM

## 2018-11-01 DIAGNOSIS — C85.80 MARGINAL ZONE LYMPHOMA (HCC): Primary | ICD-10-CM

## 2018-11-01 PROCEDURE — 96413 CHEMO IV INFUSION 1 HR: CPT

## 2018-11-01 PROCEDURE — A4216 STERILE WATER/SALINE, 10 ML: HCPCS

## 2018-11-01 PROCEDURE — 96411 CHEMO IV PUSH ADDL DRUG: CPT

## 2018-11-01 PROCEDURE — 96415 CHEMO IV INFUSION ADDL HR: CPT

## 2018-11-01 PROCEDURE — 96375 TX/PRO/DX INJ NEW DRUG ADDON: CPT

## 2018-11-01 RX ORDER — DIPHENHYDRAMINE HCL 50 MG
50 CAPSULE ORAL ONCE
Status: COMPLETED | OUTPATIENT
Start: 2018-11-01 | End: 2018-11-01

## 2018-11-01 RX ORDER — 0.9 % SODIUM CHLORIDE 0.9 %
VIAL (ML) INJECTION
Status: DISCONTINUED
Start: 2018-11-01 | End: 2018-11-01

## 2018-11-01 RX ORDER — ACETAMINOPHEN 325 MG/1
650 TABLET ORAL ONCE
Status: COMPLETED | OUTPATIENT
Start: 2018-11-01 | End: 2018-11-01

## 2018-11-01 RX ORDER — ACETAMINOPHEN 325 MG/1
TABLET ORAL
Status: COMPLETED
Start: 2018-11-01 | End: 2018-11-01

## 2018-11-01 RX ORDER — SODIUM CHLORIDE 9 MG/ML
INJECTION, SOLUTION INTRAVENOUS
Status: DISCONTINUED
Start: 2018-11-01 | End: 2018-11-01

## 2018-11-01 RX ORDER — DIPHENHYDRAMINE HCL 50 MG
CAPSULE ORAL
Status: COMPLETED
Start: 2018-11-01 | End: 2018-11-01

## 2018-11-01 RX ADMIN — DIPHENHYDRAMINE HCL 50 MG: 50 MG CAPSULE ORAL at 08:43:00

## 2018-11-01 RX ADMIN — ACETAMINOPHEN 650 MG: 325 TABLET ORAL at 08:43:00

## 2018-11-01 NOTE — PROGRESS NOTES
Pt here for C3D1 Rituxan (rapid rate) and  Bendeka.   Arrives Ambulating independently, accompanied by her father  Modifications in dose or schedule: No    Patient reports she is feeling well, complains of fatigue especially over the weekend of her treatmen

## 2018-11-02 ENCOUNTER — OFFICE VISIT (OUTPATIENT)
Dept: HEMATOLOGY/ONCOLOGY | Facility: HOSPITAL | Age: 43
End: 2018-11-02
Attending: INTERNAL MEDICINE
Payer: COMMERCIAL

## 2018-11-02 VITALS
HEART RATE: 66 BPM | SYSTOLIC BLOOD PRESSURE: 110 MMHG | TEMPERATURE: 98 F | RESPIRATION RATE: 16 BRPM | DIASTOLIC BLOOD PRESSURE: 63 MMHG

## 2018-11-02 DIAGNOSIS — C85.80 MARGINAL ZONE LYMPHOMA (HCC): Primary | ICD-10-CM

## 2018-11-02 PROCEDURE — 96375 TX/PRO/DX INJ NEW DRUG ADDON: CPT

## 2018-11-02 PROCEDURE — A4216 STERILE WATER/SALINE, 10 ML: HCPCS

## 2018-11-02 PROCEDURE — 96409 CHEMO IV PUSH SNGL DRUG: CPT

## 2018-11-02 PROCEDURE — 96413 CHEMO IV INFUSION 1 HR: CPT

## 2018-11-02 PROCEDURE — 96415 CHEMO IV INFUSION ADDL HR: CPT

## 2018-11-02 RX ORDER — SODIUM CHLORIDE 9 MG/ML
INJECTION, SOLUTION INTRAVENOUS
Status: DISCONTINUED
Start: 2018-11-02 | End: 2018-11-02

## 2018-11-02 RX ORDER — 0.9 % SODIUM CHLORIDE 0.9 %
VIAL (ML) INJECTION
Status: DISCONTINUED
Start: 2018-11-02 | End: 2018-11-02

## 2018-11-27 ENCOUNTER — LABORATORY ENCOUNTER (OUTPATIENT)
Dept: HEMATOLOGY/ONCOLOGY | Facility: HOSPITAL | Age: 43
End: 2018-11-27
Attending: INTERNAL MEDICINE
Payer: COMMERCIAL

## 2018-11-27 VITALS
HEIGHT: 62 IN | BODY MASS INDEX: 22.63 KG/M2 | WEIGHT: 123 LBS | TEMPERATURE: 98 F | HEART RATE: 60 BPM | SYSTOLIC BLOOD PRESSURE: 116 MMHG | DIASTOLIC BLOOD PRESSURE: 75 MMHG | RESPIRATION RATE: 16 BRPM

## 2018-11-27 DIAGNOSIS — C85.80 MARGINAL ZONE LYMPHOMA (HCC): Primary | ICD-10-CM

## 2018-11-27 DIAGNOSIS — Z09 CHEMOTHERAPY FOLLOW-UP EXAMINATION: ICD-10-CM

## 2018-11-27 PROCEDURE — 99215 OFFICE O/P EST HI 40 MIN: CPT | Performed by: NURSE PRACTITIONER

## 2018-11-27 PROCEDURE — 80053 COMPREHEN METABOLIC PANEL: CPT

## 2018-11-27 PROCEDURE — 85025 COMPLETE CBC W/AUTO DIFF WBC: CPT

## 2018-11-27 PROCEDURE — 36415 COLL VENOUS BLD VENIPUNCTURE: CPT

## 2018-11-27 RX ORDER — LORAZEPAM 2 MG/ML
INJECTION INTRAMUSCULAR EVERY 4 HOURS PRN
Status: CANCELLED | OUTPATIENT
Start: 2018-11-29

## 2018-11-27 RX ORDER — DIPHENHYDRAMINE HCL 50 MG
50 CAPSULE ORAL ONCE
Status: CANCELLED | OUTPATIENT
Start: 2018-11-29

## 2018-11-27 RX ORDER — LORAZEPAM 1 MG/1
TABLET ORAL EVERY 4 HOURS PRN
Status: CANCELLED | OUTPATIENT
Start: 2018-11-30

## 2018-11-27 RX ORDER — PROCHLORPERAZINE MALEATE 10 MG
10 TABLET ORAL EVERY 6 HOURS PRN
Status: CANCELLED | OUTPATIENT
Start: 2018-11-30

## 2018-11-27 RX ORDER — ONDANSETRON 2 MG/ML
8 INJECTION INTRAMUSCULAR; INTRAVENOUS EVERY 6 HOURS PRN
Status: CANCELLED | OUTPATIENT
Start: 2018-11-29

## 2018-11-27 RX ORDER — LORAZEPAM 1 MG/1
TABLET ORAL EVERY 4 HOURS PRN
Status: CANCELLED | OUTPATIENT
Start: 2018-11-29

## 2018-11-27 RX ORDER — METOCLOPRAMIDE HYDROCHLORIDE 5 MG/ML
10 INJECTION INTRAMUSCULAR; INTRAVENOUS EVERY 6 HOURS PRN
Status: CANCELLED | OUTPATIENT
Start: 2018-11-29

## 2018-11-27 RX ORDER — ACETAMINOPHEN 325 MG/1
650 TABLET ORAL ONCE
Status: CANCELLED | OUTPATIENT
Start: 2018-11-29

## 2018-11-27 RX ORDER — METOCLOPRAMIDE HYDROCHLORIDE 5 MG/ML
10 INJECTION INTRAMUSCULAR; INTRAVENOUS EVERY 6 HOURS PRN
Status: CANCELLED | OUTPATIENT
Start: 2018-11-30

## 2018-11-27 RX ORDER — LORAZEPAM 2 MG/ML
INJECTION INTRAMUSCULAR EVERY 4 HOURS PRN
Status: CANCELLED | OUTPATIENT
Start: 2018-11-30

## 2018-11-27 RX ORDER — PROCHLORPERAZINE MALEATE 10 MG
10 TABLET ORAL EVERY 6 HOURS PRN
Status: CANCELLED | OUTPATIENT
Start: 2018-11-29

## 2018-11-27 RX ORDER — ONDANSETRON 2 MG/ML
8 INJECTION INTRAMUSCULAR; INTRAVENOUS EVERY 6 HOURS PRN
Status: CANCELLED | OUTPATIENT
Start: 2018-11-30

## 2018-11-27 NOTE — PROGRESS NOTES
HPI     Angelic Klein is a 39year old female here for f/u of Marginal zone lymphoma (hcc)  (primary encounter diagnosis)  Chemotherapy follow-up examination     Here today for follow up s/p Cycle 3 Bendamustine Rituxan.  Some fatigue week after treatmen Neurological: Negative for dizziness, weakness, light-headedness, numbness and headaches. Has had vertigo   Hematological: Positive for adenopathy (skin neck and R upper arm improved). Bruises/bleeds easily.    Psychiatric/Behavioral: Negative for sl Number of children: Not on file      Years of education: Not on file      Highest education level: Not on file    Social Needs      Financial resource strain: Not on file      Food insecurity - worry: Not on file      Food insecurity - inability: Not o 08/31/18 : 57.2 kg (126 lb)    Physical Exam   Constitutional: She is oriented to person, place, and time. She appears well-developed and well-nourished. No distress. HENT:   Head: Normocephalic and atraumatic.    Mouth/Throat: Oropharynx is clear and hailey Neurological: She is alert and oriented to person, place, and time. She has normal reflexes. No cranial nerve deficit. Skin: Skin is warm and dry. No rash noted. She is not diaphoretic. No erythema. No pallor.         Psychiatric: She has a normal mood an No orders of the defined types were placed in this encounter.           Results From Past 48 Hours:  Recent Results (from the past 48 hour(s))   COMP METABOLIC PANEL (14)    Collection Time: 11/27/18  4:23 PM   Result Value Ref Range    Glucose 94 70 - 99 m 0.50 - 1.50 mg/dL 0.85 0.86   CALCIUM      8.5 - 10.5 mg/dL 9.4 9.4   ALT (SGPT)      14 - 54 U/L 14 12 (L)   AST (SGOT)      15 - 41 U/L 17 15   ALKALINE PHOSPHATASE      32 - 100 U/L 57 54   Total Bilirubin      0.3 - 1.2 mg/dL 1.5 (H) 1.2   TOTAL WI

## 2018-11-28 ENCOUNTER — APPOINTMENT (OUTPATIENT)
Dept: HEMATOLOGY/ONCOLOGY | Facility: HOSPITAL | Age: 43
End: 2018-11-28
Attending: INTERNAL MEDICINE
Payer: COMMERCIAL

## 2018-11-29 ENCOUNTER — OFFICE VISIT (OUTPATIENT)
Dept: HEMATOLOGY/ONCOLOGY | Facility: HOSPITAL | Age: 43
End: 2018-11-29
Attending: INTERNAL MEDICINE
Payer: COMMERCIAL

## 2018-11-29 VITALS
HEART RATE: 56 BPM | TEMPERATURE: 98 F | SYSTOLIC BLOOD PRESSURE: 93 MMHG | DIASTOLIC BLOOD PRESSURE: 46 MMHG | RESPIRATION RATE: 16 BRPM

## 2018-11-29 DIAGNOSIS — C85.80 MARGINAL ZONE LYMPHOMA (HCC): Primary | ICD-10-CM

## 2018-11-29 PROCEDURE — A4216 STERILE WATER/SALINE, 10 ML: HCPCS

## 2018-11-29 PROCEDURE — 96409 CHEMO IV PUSH SNGL DRUG: CPT

## 2018-11-29 PROCEDURE — 96415 CHEMO IV INFUSION ADDL HR: CPT

## 2018-11-29 PROCEDURE — 96413 CHEMO IV INFUSION 1 HR: CPT

## 2018-11-29 PROCEDURE — 96411 CHEMO IV PUSH ADDL DRUG: CPT

## 2018-11-29 PROCEDURE — 96367 TX/PROPH/DG ADDL SEQ IV INF: CPT

## 2018-11-29 RX ORDER — DIPHENHYDRAMINE HCL 50 MG
50 CAPSULE ORAL ONCE
Status: COMPLETED | OUTPATIENT
Start: 2018-11-29 | End: 2018-11-29

## 2018-11-29 RX ORDER — SODIUM CHLORIDE 9 MG/ML
INJECTION, SOLUTION INTRAVENOUS
Status: DISCONTINUED
Start: 2018-11-29 | End: 2018-11-29

## 2018-11-29 RX ORDER — DIPHENHYDRAMINE HCL 50 MG
CAPSULE ORAL
Status: COMPLETED
Start: 2018-11-29 | End: 2018-11-29

## 2018-11-29 RX ORDER — 0.9 % SODIUM CHLORIDE 0.9 %
VIAL (ML) INJECTION
Status: DISCONTINUED
Start: 2018-11-29 | End: 2018-11-29

## 2018-11-29 RX ORDER — ACETAMINOPHEN 325 MG/1
TABLET ORAL
Status: COMPLETED
Start: 2018-11-29 | End: 2018-11-29

## 2018-11-29 RX ORDER — ACETAMINOPHEN 325 MG/1
650 TABLET ORAL ONCE
Status: COMPLETED | OUTPATIENT
Start: 2018-11-29 | End: 2018-11-29

## 2018-11-29 RX ADMIN — ACETAMINOPHEN 650 MG: 325 TABLET ORAL at 08:43:00

## 2018-11-29 RX ADMIN — DIPHENHYDRAMINE HCL 50 MG: 50 MG CAPSULE ORAL at 08:43:00

## 2018-11-29 NOTE — PROGRESS NOTES
Pt here for [facility]. Arrives Ambulating independently  Modifications in dose or schedule: No    Patient reports she is feeling really well, denies any complaints.     PIV started in right FA, on 3rd attempt by [facility] RN, positive blood

## 2018-11-30 ENCOUNTER — OFFICE VISIT (OUTPATIENT)
Dept: HEMATOLOGY/ONCOLOGY | Facility: HOSPITAL | Age: 43
End: 2018-11-30
Attending: INTERNAL MEDICINE
Payer: COMMERCIAL

## 2018-11-30 VITALS
HEART RATE: 60 BPM | TEMPERATURE: 98 F | RESPIRATION RATE: 16 BRPM | DIASTOLIC BLOOD PRESSURE: 70 MMHG | SYSTOLIC BLOOD PRESSURE: 112 MMHG

## 2018-11-30 DIAGNOSIS — C85.80 MARGINAL ZONE LYMPHOMA (HCC): Primary | ICD-10-CM

## 2018-11-30 PROCEDURE — A4216 STERILE WATER/SALINE, 10 ML: HCPCS

## 2018-11-30 PROCEDURE — 96367 TX/PROPH/DG ADDL SEQ IV INF: CPT

## 2018-11-30 PROCEDURE — 96413 CHEMO IV INFUSION 1 HR: CPT

## 2018-11-30 RX ORDER — 0.9 % SODIUM CHLORIDE 0.9 %
VIAL (ML) INJECTION
Status: DISCONTINUED
Start: 2018-11-30 | End: 2018-11-30

## 2018-11-30 RX ORDER — SODIUM CHLORIDE 9 MG/ML
INJECTION, SOLUTION INTRAVENOUS
Status: DISCONTINUED
Start: 2018-11-30 | End: 2018-11-30

## 2018-11-30 NOTE — PROGRESS NOTES
Pt here for C4D2 Bendeka. Arrives Ambulating independently  Modifications in dose or schedule: No     Patient reports she is feeling really well, denies any complaints.      PIV started in right wrist, on 3rd attempt by Arielle Cox RN, positive blood return n

## 2018-12-09 ENCOUNTER — HOSPITAL ENCOUNTER (OUTPATIENT)
Dept: CT IMAGING | Facility: HOSPITAL | Age: 43
Discharge: HOME | End: 2018-12-09
Attending: NURSE PRACTITIONER
Payer: COMMERCIAL

## 2018-12-09 DIAGNOSIS — C85.80 MARGINAL ZONE LYMPHOMA (HCC): ICD-10-CM

## 2018-12-09 PROCEDURE — 82565 ASSAY OF CREATININE: CPT

## 2018-12-09 PROCEDURE — 74177 CT ABD & PELVIS W/CONTRAST: CPT | Performed by: NURSE PRACTITIONER

## 2018-12-09 PROCEDURE — 71260 CT THORAX DX C+: CPT | Performed by: NURSE PRACTITIONER

## 2018-12-21 ENCOUNTER — TELEPHONE (OUTPATIENT)
Dept: HEMATOLOGY/ONCOLOGY | Facility: HOSPITAL | Age: 43
End: 2018-12-21

## 2018-12-21 NOTE — TELEPHONE ENCOUNTER
Toxicities:  C4 D2 11/30/2018 Bendamustine  Due for labs & prechemo visit on 12/26/2018 & C5 D1 Rituximab/Bendamustine on 12/27/2018    Watery Eyes/Enlarged Lymph nodes under her jaw    Fever: Grade 0(Montserrat is concerned she is getting sick.  She was around

## 2018-12-21 NOTE — TELEPHONE ENCOUNTER
Araceli Delong was around her little nieces and nephews they were sick. Araceli Delong now feels like her glands are swollen and tired eyes watery. She is scheduled for treatment next week.  maría

## 2018-12-26 ENCOUNTER — LABORATORY ENCOUNTER (OUTPATIENT)
Dept: HEMATOLOGY/ONCOLOGY | Facility: HOSPITAL | Age: 43
End: 2018-12-26
Attending: INTERNAL MEDICINE
Payer: COMMERCIAL

## 2018-12-26 VITALS
DIASTOLIC BLOOD PRESSURE: 53 MMHG | RESPIRATION RATE: 18 BRPM | WEIGHT: 125 LBS | TEMPERATURE: 97 F | HEART RATE: 50 BPM | BODY MASS INDEX: 23 KG/M2 | HEIGHT: 62 IN | SYSTOLIC BLOOD PRESSURE: 112 MMHG

## 2018-12-26 DIAGNOSIS — C85.80 MARGINAL ZONE LYMPHOMA (HCC): Primary | ICD-10-CM

## 2018-12-26 DIAGNOSIS — Z09 CHEMOTHERAPY FOLLOW-UP EXAMINATION: ICD-10-CM

## 2018-12-26 PROCEDURE — 85025 COMPLETE CBC W/AUTO DIFF WBC: CPT

## 2018-12-26 PROCEDURE — 80053 COMPREHEN METABOLIC PANEL: CPT

## 2018-12-26 PROCEDURE — 99215 OFFICE O/P EST HI 40 MIN: CPT | Performed by: INTERNAL MEDICINE

## 2018-12-26 PROCEDURE — 36415 COLL VENOUS BLD VENIPUNCTURE: CPT

## 2018-12-26 RX ORDER — DIPHENHYDRAMINE HCL 50 MG
50 CAPSULE ORAL ONCE
Status: CANCELLED | OUTPATIENT
Start: 2018-12-27

## 2018-12-26 RX ORDER — LORAZEPAM 1 MG/1
TABLET ORAL EVERY 4 HOURS PRN
Status: CANCELLED | OUTPATIENT
Start: 2018-12-28

## 2018-12-26 RX ORDER — ONDANSETRON 2 MG/ML
8 INJECTION INTRAMUSCULAR; INTRAVENOUS EVERY 6 HOURS PRN
Status: CANCELLED | OUTPATIENT
Start: 2018-12-28

## 2018-12-26 RX ORDER — ACETAMINOPHEN 325 MG/1
650 TABLET ORAL ONCE
Status: CANCELLED | OUTPATIENT
Start: 2018-12-27

## 2018-12-26 RX ORDER — METOCLOPRAMIDE HYDROCHLORIDE 5 MG/ML
10 INJECTION INTRAMUSCULAR; INTRAVENOUS EVERY 6 HOURS PRN
Status: CANCELLED | OUTPATIENT
Start: 2018-12-27

## 2018-12-26 RX ORDER — METOCLOPRAMIDE HYDROCHLORIDE 5 MG/ML
10 INJECTION INTRAMUSCULAR; INTRAVENOUS EVERY 6 HOURS PRN
Status: CANCELLED | OUTPATIENT
Start: 2018-12-28

## 2018-12-26 RX ORDER — ONDANSETRON 2 MG/ML
8 INJECTION INTRAMUSCULAR; INTRAVENOUS EVERY 6 HOURS PRN
Status: CANCELLED | OUTPATIENT
Start: 2018-12-27

## 2018-12-26 RX ORDER — LORAZEPAM 2 MG/ML
INJECTION INTRAMUSCULAR EVERY 4 HOURS PRN
Status: CANCELLED | OUTPATIENT
Start: 2018-12-27

## 2018-12-26 RX ORDER — LORAZEPAM 2 MG/ML
INJECTION INTRAMUSCULAR EVERY 4 HOURS PRN
Status: CANCELLED | OUTPATIENT
Start: 2018-12-28

## 2018-12-26 RX ORDER — PROCHLORPERAZINE MALEATE 10 MG
10 TABLET ORAL EVERY 6 HOURS PRN
Status: CANCELLED | OUTPATIENT
Start: 2018-12-28

## 2018-12-26 RX ORDER — PROCHLORPERAZINE MALEATE 10 MG
10 TABLET ORAL EVERY 6 HOURS PRN
Status: CANCELLED | OUTPATIENT
Start: 2018-12-27

## 2018-12-26 RX ORDER — LORAZEPAM 1 MG/1
TABLET ORAL EVERY 4 HOURS PRN
Status: CANCELLED | OUTPATIENT
Start: 2018-12-27

## 2018-12-26 NOTE — PROGRESS NOTES
VERÓNICA Hoffman is a 39year old female here for f/u of Marginal zone lymphoma (hcc)  (primary encounter diagnosis)  Chemotherapy follow-up examination     Here today for follow up s/p Cycle 4 Bendamustine Rituxan.      Some fatigue week after willy Loratadine-Pseudoephedrine (CLARITIN-D 12 HOUR OR) Take by mouth. Disp:  Rfl:    metRONIDAZOLE 500 MG Oral Tab metronidazole 500 mg tablet Disp:  Rfl:    acyclovir 400 MG Oral Tab Take 1 tablet (400 mg total) by mouth 2 (two) times daily.  Disp: 60 tablet R Alcohol use:  Yes        Alcohol/week: 0.0 oz        Comment: a drinks a week, on weekends      Drug use: Yes        Types: Cannabis        Comment: Rare use      Sexual activity: Not on file    Other Topics      Concerns:         Service: Not A Pulmonary/Chest: Effort normal and breath sounds normal. No respiratory distress. She has no wheezes. She has no rales. Abdominal: Soft. Bowel sounds are normal. She exhibits no distension. There is no splenomegaly or hepatomegaly.  There is no tenderness CT with slow progression of disease and slight progression of axillary LAD. D/w patient that at this point given slow steady progression, consider treatment vs continue observation with eventual treatment.       Current PET/CT with mostly stable disease ex Globulin 2.0 (L) 2.5 - 3.7 g/dL    A/G Ratio 1.9 1.0 - 2.0    Anion Gap 5 0 - 18 mmol/L    BUN/CREA Ratio 13.2 10.0 - 20.0    Calculated Osmolality 282 275 - 295 mOsm/kg    GFR, Non- >60 >=60    GFR, -American >60 >=60    FASTING No preserved. Cardiac chambers are unremarkable. Pericardium is normal. The main pulmonary artery has a normal diameter and is otherwise unremarkable. 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      LUNGS AND PLEURA:  There is a 6 mm nodul MESENTERY:   Significant interval decrease in size of previously visualized mesenteric lymphadenopathy.  There is a 2.4 x 1.4 cm portal caval lymph node (previously 7 x 4.4 cm.) Multiple other subcentimeter mesenteric lymph nodes are seen which are   nonspe

## 2018-12-27 ENCOUNTER — OFFICE VISIT (OUTPATIENT)
Dept: HEMATOLOGY/ONCOLOGY | Facility: HOSPITAL | Age: 43
End: 2018-12-27
Attending: INTERNAL MEDICINE
Payer: COMMERCIAL

## 2018-12-27 VITALS
SYSTOLIC BLOOD PRESSURE: 97 MMHG | HEART RATE: 56 BPM | RESPIRATION RATE: 16 BRPM | TEMPERATURE: 98 F | DIASTOLIC BLOOD PRESSURE: 62 MMHG

## 2018-12-27 DIAGNOSIS — C85.80 MARGINAL ZONE LYMPHOMA (HCC): Primary | ICD-10-CM

## 2018-12-27 PROCEDURE — 96411 CHEMO IV PUSH ADDL DRUG: CPT

## 2018-12-27 PROCEDURE — 96375 TX/PRO/DX INJ NEW DRUG ADDON: CPT

## 2018-12-27 PROCEDURE — 96413 CHEMO IV INFUSION 1 HR: CPT

## 2018-12-27 PROCEDURE — 96415 CHEMO IV INFUSION ADDL HR: CPT

## 2018-12-27 RX ORDER — ACETAMINOPHEN 325 MG/1
650 TABLET ORAL ONCE
Status: COMPLETED | OUTPATIENT
Start: 2018-12-27 | End: 2018-12-27

## 2018-12-27 RX ORDER — ACETAMINOPHEN 325 MG/1
TABLET ORAL
Status: DISCONTINUED
Start: 2018-12-27 | End: 2018-12-27

## 2018-12-27 RX ORDER — DIPHENHYDRAMINE HCL 50 MG
50 CAPSULE ORAL ONCE
Status: COMPLETED | OUTPATIENT
Start: 2018-12-27 | End: 2018-12-27

## 2018-12-27 RX ORDER — DIPHENHYDRAMINE HCL 50 MG
CAPSULE ORAL
Status: DISCONTINUED
Start: 2018-12-27 | End: 2018-12-27

## 2018-12-27 RX ORDER — SODIUM CHLORIDE 9 MG/ML
INJECTION, SOLUTION INTRAVENOUS
Status: DISCONTINUED
Start: 2018-12-27 | End: 2018-12-27

## 2018-12-27 RX ADMIN — DIPHENHYDRAMINE HCL 50 MG: 50 MG CAPSULE ORAL at 08:35:00

## 2018-12-27 RX ADMIN — ACETAMINOPHEN 650 MG: 325 TABLET ORAL at 08:35:00

## 2018-12-27 NOTE — PROGRESS NOTES
Pt here for Alexander. Arrives Ambulating independently  Modifications in dose or schedule: No    Patient states she is doing well, just c/o feeling a little tired. PIV started in left forearm on first attempt.  Slight bruise noted when inse

## 2018-12-28 ENCOUNTER — OFFICE VISIT (OUTPATIENT)
Dept: HEMATOLOGY/ONCOLOGY | Facility: HOSPITAL | Age: 43
End: 2018-12-28
Attending: INTERNAL MEDICINE
Payer: COMMERCIAL

## 2018-12-28 VITALS
RESPIRATION RATE: 16 BRPM | TEMPERATURE: 98 F | DIASTOLIC BLOOD PRESSURE: 37 MMHG | HEART RATE: 56 BPM | SYSTOLIC BLOOD PRESSURE: 105 MMHG

## 2018-12-28 DIAGNOSIS — C85.80 MARGINAL ZONE LYMPHOMA (HCC): Primary | ICD-10-CM

## 2018-12-28 PROCEDURE — A4216 STERILE WATER/SALINE, 10 ML: HCPCS

## 2018-12-28 PROCEDURE — 96409 CHEMO IV PUSH SNGL DRUG: CPT

## 2018-12-28 PROCEDURE — 96375 TX/PRO/DX INJ NEW DRUG ADDON: CPT

## 2018-12-28 RX ORDER — 0.9 % SODIUM CHLORIDE 0.9 %
VIAL (ML) INJECTION
Status: DISPENSED
Start: 2018-12-28 | End: 2018-12-28

## 2018-12-28 RX ORDER — HEPARIN SODIUM (PORCINE) LOCK FLUSH IV SOLN 100 UNIT/ML 100 UNIT/ML
SOLUTION INTRAVENOUS
Status: DISPENSED
Start: 2018-12-28 | End: 2018-12-28

## 2018-12-28 RX ORDER — 0.9 % SODIUM CHLORIDE 0.9 %
VIAL (ML) INJECTION
Status: DISCONTINUED
Start: 2018-12-28 | End: 2018-12-28

## 2018-12-28 RX ORDER — SODIUM CHLORIDE 9 MG/ML
INJECTION, SOLUTION INTRAVENOUS
Status: DISCONTINUED
Start: 2018-12-28 | End: 2018-12-28

## 2018-12-28 NOTE — PROGRESS NOTES
Pt here for Roberta Raymondp. Arrives Ambulating independently     Modifications in dose or schedule: No, platelets 90- okay to treat present in Dr. Delfino Craft notes.      Patient reports she is feeling well, complains of fatigue especially over the weekend of he

## 2019-01-13 RX ORDER — ACYCLOVIR 400 MG/1
TABLET ORAL
Qty: 60 TABLET | Refills: 0 | Status: SHIPPED | OUTPATIENT
Start: 2019-01-13 | End: 2019-02-20

## 2019-01-22 ENCOUNTER — NURSE ONLY (OUTPATIENT)
Dept: HEMATOLOGY/ONCOLOGY | Facility: HOSPITAL | Age: 44
End: 2019-01-22
Attending: INTERNAL MEDICINE
Payer: COMMERCIAL

## 2019-01-22 VITALS
HEART RATE: 50 BPM | HEIGHT: 62 IN | DIASTOLIC BLOOD PRESSURE: 63 MMHG | SYSTOLIC BLOOD PRESSURE: 115 MMHG | TEMPERATURE: 98 F | WEIGHT: 124 LBS | RESPIRATION RATE: 16 BRPM | BODY MASS INDEX: 22.82 KG/M2

## 2019-01-22 DIAGNOSIS — C85.80 MARGINAL ZONE LYMPHOMA (HCC): Primary | ICD-10-CM

## 2019-01-22 DIAGNOSIS — Z09 CHEMOTHERAPY FOLLOW-UP EXAMINATION: ICD-10-CM

## 2019-01-22 LAB
ALBUMIN SERPL BCP-MCNC: 4.4 G/DL (ref 3.5–4.8)
ALBUMIN/GLOB SERPL: 2 {RATIO} (ref 1–2)
ALP SERPL-CCNC: 64 U/L (ref 32–100)
ALT SERPL-CCNC: 15 U/L (ref 14–54)
ANION GAP SERPL CALC-SCNC: 14 MMOL/L (ref 0–18)
AST SERPL-CCNC: 19 U/L (ref 15–41)
BASOPHILS # BLD: 0 K/UL (ref 0–0.2)
BASOPHILS NFR BLD: 1 %
BILIRUB SERPL-MCNC: 1 MG/DL (ref 0.3–1.2)
BUN SERPL-MCNC: 8 MG/DL (ref 8–20)
BUN/CREAT SERPL: 9.4 (ref 10–20)
CALCIUM SERPL-MCNC: 9.7 MG/DL (ref 8.5–10.5)
CHLORIDE SERPL-SCNC: 100 MMOL/L (ref 95–110)
CO2 SERPL-SCNC: 24 MMOL/L (ref 22–32)
CREAT SERPL-MCNC: 0.85 MG/DL (ref 0.5–1.5)
EOSINOPHIL # BLD: 0.1 K/UL (ref 0–0.7)
EOSINOPHIL NFR BLD: 3 %
ERYTHROCYTE [DISTWIDTH] IN BLOOD BY AUTOMATED COUNT: 14.9 % (ref 11–15)
GLOBULIN PLAS-MCNC: 2.2 G/DL (ref 2.5–3.7)
GLUCOSE SERPL-MCNC: 97 MG/DL (ref 70–99)
HCT VFR BLD AUTO: 35.8 % (ref 35–48)
HGB BLD-MCNC: 12.5 G/DL (ref 12–16)
LYMPHOCYTES # BLD: 0.4 K/UL (ref 1–4)
LYMPHOCYTES NFR BLD: 13 %
MCH RBC QN AUTO: 31.8 PG (ref 27–32)
MCHC RBC AUTO-ENTMCNC: 35 G/DL (ref 32–37)
MCV RBC AUTO: 90.8 FL (ref 80–100)
MONOCYTES # BLD: 0.6 K/UL (ref 0–1)
MONOCYTES NFR BLD: 17 %
NEUTROPHILS # BLD AUTO: 2.2 K/UL (ref 1.8–7.7)
NEUTROPHILS NFR BLD: 67 %
OSMOLALITY UR CALC.SUM OF ELEC: 284 MOSM/KG (ref 275–295)
PATIENT FASTING: NO
PLATELET # BLD AUTO: 112 K/UL (ref 140–400)
PMV BLD AUTO: 8.8 FL (ref 7.4–10.3)
POTASSIUM SERPL-SCNC: 4.4 MMOL/L (ref 3.3–5.1)
PROT SERPL-MCNC: 6.6 G/DL (ref 5.9–8.4)
RBC # BLD AUTO: 3.94 M/UL (ref 3.7–5.4)
SODIUM SERPL-SCNC: 138 MMOL/L (ref 136–144)
WBC # BLD AUTO: 3.3 K/UL (ref 4–11)

## 2019-01-22 PROCEDURE — 85025 COMPLETE CBC W/AUTO DIFF WBC: CPT

## 2019-01-22 PROCEDURE — 36415 COLL VENOUS BLD VENIPUNCTURE: CPT

## 2019-01-22 PROCEDURE — 99215 OFFICE O/P EST HI 40 MIN: CPT | Performed by: NURSE PRACTITIONER

## 2019-01-22 PROCEDURE — 80053 COMPREHEN METABOLIC PANEL: CPT

## 2019-01-22 RX ORDER — METOCLOPRAMIDE HYDROCHLORIDE 5 MG/ML
10 INJECTION INTRAMUSCULAR; INTRAVENOUS EVERY 6 HOURS PRN
Status: CANCELLED | OUTPATIENT
Start: 2019-01-24

## 2019-01-22 RX ORDER — LORAZEPAM 1 MG/1
TABLET ORAL EVERY 4 HOURS PRN
Status: CANCELLED | OUTPATIENT
Start: 2019-01-25

## 2019-01-22 RX ORDER — PROCHLORPERAZINE MALEATE 10 MG
10 TABLET ORAL EVERY 6 HOURS PRN
Status: CANCELLED | OUTPATIENT
Start: 2019-01-25

## 2019-01-22 RX ORDER — PROCHLORPERAZINE MALEATE 10 MG
10 TABLET ORAL EVERY 6 HOURS PRN
Status: CANCELLED | OUTPATIENT
Start: 2019-01-24

## 2019-01-22 RX ORDER — METOCLOPRAMIDE HYDROCHLORIDE 5 MG/ML
10 INJECTION INTRAMUSCULAR; INTRAVENOUS EVERY 6 HOURS PRN
Status: CANCELLED | OUTPATIENT
Start: 2019-01-25

## 2019-01-22 RX ORDER — LORAZEPAM 2 MG/ML
INJECTION INTRAMUSCULAR EVERY 4 HOURS PRN
Status: CANCELLED | OUTPATIENT
Start: 2019-01-25

## 2019-01-22 RX ORDER — DIPHENHYDRAMINE HCL 50 MG
50 CAPSULE ORAL ONCE
Status: CANCELLED | OUTPATIENT
Start: 2019-01-24

## 2019-01-22 RX ORDER — ACETAMINOPHEN 325 MG/1
650 TABLET ORAL ONCE
Status: CANCELLED | OUTPATIENT
Start: 2019-01-24

## 2019-01-22 RX ORDER — ONDANSETRON 2 MG/ML
8 INJECTION INTRAMUSCULAR; INTRAVENOUS EVERY 6 HOURS PRN
Status: CANCELLED | OUTPATIENT
Start: 2019-01-24

## 2019-01-22 RX ORDER — ONDANSETRON 2 MG/ML
8 INJECTION INTRAMUSCULAR; INTRAVENOUS EVERY 6 HOURS PRN
Status: CANCELLED | OUTPATIENT
Start: 2019-01-25

## 2019-01-22 RX ORDER — LORAZEPAM 1 MG/1
TABLET ORAL EVERY 4 HOURS PRN
Status: CANCELLED | OUTPATIENT
Start: 2019-01-24

## 2019-01-22 RX ORDER — LORAZEPAM 2 MG/ML
INJECTION INTRAMUSCULAR EVERY 4 HOURS PRN
Status: CANCELLED | OUTPATIENT
Start: 2019-01-24

## 2019-01-22 NOTE — PROGRESS NOTES
HPI     Kena Varma is a 39year old female here for f/u of Marginal zone lymphoma (hcc)  (primary encounter diagnosis)  Chemotherapy follow-up examination     Here today for follow up s/p Cycle 5 Bendamustine Rituxan.      Some fatigue week after willy Loratadine-Pseudoephedrine (CLARITIN-D 12 HOUR OR) Take by mouth.  Disp:  Rfl:    metRONIDAZOLE 500 MG Oral Tab metronidazole 500 mg tablet Disp:  Rfl:    Prochlorperazine Maleate (COMPAZINE) 10 mg tablet Take 1 tablet (10 mg total) by mouth every 8 (eight) Types: Cannabis        Comment: Rare use      Sexual activity: Not on file    Other Topics      Concerns:         Service: Not Asked        Blood Transfusions: Not Asked        Caffeine Concern: Yes          1 cup of coffee        Occupationa Pulmonary/Chest: Effort normal and breath sounds normal. No respiratory distress. She has no wheezes. She has no rales. Abdominal: Soft. Bowel sounds are normal. She exhibits no distension. There is no splenomegaly or hepatomegaly.  There is no tenderness CT with slow progression of disease and slight progression of axillary LAD. D/w patient that at this point given slow steady progression, consider treatment vs continue observation with eventual treatment.       Current PET/CT with mostly stable disease ex Globulin 2.2 (L) 2.5 - 3.7 g/dL    A/G Ratio 2.0 1.0 - 2.0    Anion Gap 14 0 - 18 mmol/L    BUN/CREA Ratio 9.4 (L) 10.0 - 20.0    Calculated Osmolality 284 275 - 295 mOsm/kg    GFR, Non- >60 >=60    GFR, -American >60 >=60    FASTIN

## 2019-01-23 ENCOUNTER — APPOINTMENT (OUTPATIENT)
Dept: HEMATOLOGY/ONCOLOGY | Facility: HOSPITAL | Age: 44
End: 2019-01-23
Attending: INTERNAL MEDICINE
Payer: COMMERCIAL

## 2019-01-24 ENCOUNTER — OFFICE VISIT (OUTPATIENT)
Dept: HEMATOLOGY/ONCOLOGY | Facility: HOSPITAL | Age: 44
End: 2019-01-24
Attending: INTERNAL MEDICINE
Payer: COMMERCIAL

## 2019-01-24 VITALS
SYSTOLIC BLOOD PRESSURE: 98 MMHG | HEART RATE: 63 BPM | DIASTOLIC BLOOD PRESSURE: 58 MMHG | TEMPERATURE: 98 F | RESPIRATION RATE: 16 BRPM

## 2019-01-24 DIAGNOSIS — C85.80 MARGINAL ZONE LYMPHOMA (HCC): Primary | ICD-10-CM

## 2019-01-24 PROCEDURE — 96411 CHEMO IV PUSH ADDL DRUG: CPT

## 2019-01-24 PROCEDURE — 96375 TX/PRO/DX INJ NEW DRUG ADDON: CPT

## 2019-01-24 PROCEDURE — 96415 CHEMO IV INFUSION ADDL HR: CPT

## 2019-01-24 PROCEDURE — 96413 CHEMO IV INFUSION 1 HR: CPT

## 2019-01-24 RX ORDER — DIPHENHYDRAMINE HCL 50 MG
50 CAPSULE ORAL ONCE
Status: COMPLETED | OUTPATIENT
Start: 2019-01-24 | End: 2019-01-24

## 2019-01-24 RX ORDER — ACETAMINOPHEN 325 MG/1
650 TABLET ORAL ONCE
Status: COMPLETED | OUTPATIENT
Start: 2019-01-24 | End: 2019-01-24

## 2019-01-24 RX ORDER — SODIUM CHLORIDE 9 MG/ML
INJECTION, SOLUTION INTRAVENOUS
Status: DISCONTINUED
Start: 2019-01-24 | End: 2019-01-24

## 2019-01-24 RX ORDER — 0.9 % SODIUM CHLORIDE 0.9 %
VIAL (ML) INJECTION
Status: DISCONTINUED
Start: 2019-01-24 | End: 2019-01-24

## 2019-01-24 RX ORDER — ACETAMINOPHEN 325 MG/1
TABLET ORAL
Status: COMPLETED
Start: 2019-01-24 | End: 2019-01-24

## 2019-01-24 RX ORDER — DIPHENHYDRAMINE HCL 50 MG
CAPSULE ORAL
Status: COMPLETED
Start: 2019-01-24 | End: 2019-01-24

## 2019-01-24 RX ADMIN — DIPHENHYDRAMINE HCL 50 MG: 50 MG CAPSULE ORAL at 08:51:00

## 2019-01-24 RX ADMIN — ACETAMINOPHEN 650 MG: 325 TABLET ORAL at 08:51:00

## 2019-01-24 NOTE — PROGRESS NOTES
Pt here for C6D1 Rituxan/ Bendeka. Arrives Ambulating independently  Will receive final chemotherapy today. Will have PET, follow-up with MD on 2/20/19. IV converted into saline lock for Cycle 6 Day 2 tomorrow.   Flushed with 10 ml ns, blue end cap appl

## 2019-01-25 ENCOUNTER — OFFICE VISIT (OUTPATIENT)
Dept: HEMATOLOGY/ONCOLOGY | Facility: HOSPITAL | Age: 44
End: 2019-01-25
Attending: INTERNAL MEDICINE
Payer: COMMERCIAL

## 2019-01-25 VITALS
OXYGEN SATURATION: 100 % | HEART RATE: 67 BPM | TEMPERATURE: 97 F | RESPIRATION RATE: 16 BRPM | SYSTOLIC BLOOD PRESSURE: 106 MMHG | DIASTOLIC BLOOD PRESSURE: 61 MMHG

## 2019-01-25 DIAGNOSIS — C85.80 MARGINAL ZONE LYMPHOMA (HCC): Primary | ICD-10-CM

## 2019-01-25 PROCEDURE — 96409 CHEMO IV PUSH SNGL DRUG: CPT

## 2019-01-25 PROCEDURE — 96367 TX/PROPH/DG ADDL SEQ IV INF: CPT

## 2019-01-25 RX ORDER — 0.9 % SODIUM CHLORIDE 0.9 %
VIAL (ML) INJECTION
Status: DISCONTINUED
Start: 2019-01-25 | End: 2019-01-25

## 2019-01-25 RX ORDER — SODIUM CHLORIDE 9 MG/ML
INJECTION, SOLUTION INTRAVENOUS
Status: DISCONTINUED
Start: 2019-01-25 | End: 2019-01-25

## 2019-01-25 NOTE — PROGRESS NOTES
Pt here for Jennifer Kessler. Arrives Ambulating independently     Modifications in dose or schedule: No, last treatment today. Waiting for scans to be approved, F/U appointment 2/20.   Patient reports she is feeling well, complains of fatigue especially ove

## 2019-02-06 ENCOUNTER — TELEPHONE (OUTPATIENT)
Dept: HEMATOLOGY/ONCOLOGY | Facility: HOSPITAL | Age: 44
End: 2019-02-06

## 2019-02-06 NOTE — TELEPHONE ENCOUNTER
I received a call from Myriam a nurse  from [facility] regarding the authorization of the PT/CT scan for Rajiv Bernal. Myriam stated that the one that is still in pending due to the fact that there has already been one denied in that range date. There is one in the chart that I can see from 12/26 that is still pending. Myriam said we might want to try and authorize it for a different range date and it could be possibly  processed.  Myriam can be reached at [phone] extension [phone]

## 2019-02-12 ENCOUNTER — TELEPHONE (OUTPATIENT)
Dept: HEMATOLOGY/ONCOLOGY | Facility: HOSPITAL | Age: 44
End: 2019-02-12

## 2019-02-12 DIAGNOSIS — C85.80 MARGINAL ZONE LYMPHOMA (HCC): Primary | ICD-10-CM

## 2019-02-12 DIAGNOSIS — Z51.81 ENCOUNTER FOR MEDICATION MONITORING: ICD-10-CM

## 2019-02-12 DIAGNOSIS — R35.0 URINARY FREQUENCY: ICD-10-CM

## 2019-02-12 NOTE — TELEPHONE ENCOUNTER
Called Leticia Gabriel and per Dr Michelle Suh to get ua and c/s done asap, she will go in morning to [facility]. Also to push fluids/cranberry juice.

## 2019-02-12 NOTE — TELEPHONE ENCOUNTER
Marginal zone Lymphoma- C6 Rituxan/Bendeka 1/24-1/25/19    Called having UTI symptoms-burning on urination, urgency, had noted some blood on tissue when wiping. Still has periods but not currently and seem different. Fever-0  Afebrile.     Had diarrhea s

## 2019-02-12 NOTE — TELEPHONE ENCOUNTER
Leticia Gabriel calling since Sunday she has been having UTI symptoms. She did take AZO. She did have diaharrea Sunday and this morning.  maría

## 2019-02-13 ENCOUNTER — APPOINTMENT (OUTPATIENT)
Dept: LAB | Age: 44
End: 2019-02-13
Attending: INTERNAL MEDICINE
Payer: COMMERCIAL

## 2019-02-13 ENCOUNTER — TELEPHONE (OUTPATIENT)
Dept: HEMATOLOGY/ONCOLOGY | Facility: HOSPITAL | Age: 44
End: 2019-02-13

## 2019-02-13 DIAGNOSIS — Z51.81 ENCOUNTER FOR MEDICATION MONITORING: ICD-10-CM

## 2019-02-13 DIAGNOSIS — R35.0 URINARY FREQUENCY: ICD-10-CM

## 2019-02-13 DIAGNOSIS — C85.80 MARGINAL ZONE LYMPHOMA (HCC): ICD-10-CM

## 2019-02-13 LAB
BILIRUB UR QL: NEGATIVE
CLARITY UR: CLEAR
COLOR UR: YELLOW
GLUCOSE UR-MCNC: NEGATIVE MG/DL
HGB UR QL STRIP.AUTO: NEGATIVE
KETONES UR-MCNC: NEGATIVE MG/DL
LEUKOCYTE ESTERASE UR QL STRIP.AUTO: NEGATIVE
NITRITE UR QL STRIP.AUTO: NEGATIVE
PH UR: 6 [PH] (ref 5–8)
PROT UR-MCNC: NEGATIVE MG/DL
SP GR UR STRIP: 1.01 (ref 1–1.03)
UROBILINOGEN UR STRIP-ACNC: <2
VIT C UR-MCNC: NEGATIVE MG/DL

## 2019-02-13 PROCEDURE — 87086 URINE CULTURE/COLONY COUNT: CPT

## 2019-02-13 PROCEDURE — 81003 URINALYSIS AUTO W/O SCOPE: CPT

## 2019-02-13 NOTE — TELEPHONE ENCOUNTER
Returned phone call and notified that UA results came back not a UTI. Instructed to continue to push fluids and may try cranberry juice. Per pt symptoms have subsided. Pt verbalizes understanding.

## 2019-02-13 NOTE — TELEPHONE ENCOUNTER
Ana Every called symptoms some better with urination but concerned it will worsen again. Wondering if MD waiting to get results before trying to treat her. Called lab, specimen may be completed in 30-60 minutes.   I told Ana Every I would up date Dr Maryanne Jon and Mar

## 2019-02-18 ENCOUNTER — HOSPITAL ENCOUNTER (OUTPATIENT)
Dept: NUCLEAR MEDICINE | Facility: HOSPITAL | Age: 44
Discharge: HOME | End: 2019-02-18
Attending: INTERNAL MEDICINE
Payer: COMMERCIAL

## 2019-02-18 DIAGNOSIS — C85.80 MARGINAL ZONE LYMPHOMA (HCC): ICD-10-CM

## 2019-02-18 LAB — GLUCOSE BLDC GLUCOMTR-MCNC: 97 MG/DL (ref 70–99)

## 2019-02-18 PROCEDURE — 78816 PET IMAGE W/CT FULL BODY: CPT | Performed by: INTERNAL MEDICINE

## 2019-02-18 PROCEDURE — 82962 GLUCOSE BLOOD TEST: CPT

## 2019-02-20 ENCOUNTER — OFFICE VISIT (OUTPATIENT)
Dept: HEMATOLOGY/ONCOLOGY | Facility: HOSPITAL | Age: 44
End: 2019-02-20
Attending: INTERNAL MEDICINE
Payer: COMMERCIAL

## 2019-02-20 VITALS
DIASTOLIC BLOOD PRESSURE: 63 MMHG | WEIGHT: 124 LBS | OXYGEN SATURATION: 100 % | SYSTOLIC BLOOD PRESSURE: 110 MMHG | BODY MASS INDEX: 22.82 KG/M2 | HEART RATE: 60 BPM | HEIGHT: 62 IN | RESPIRATION RATE: 16 BRPM

## 2019-02-20 DIAGNOSIS — C85.80 MARGINAL ZONE LYMPHOMA (HCC): Primary | ICD-10-CM

## 2019-02-20 DIAGNOSIS — Z09 CHEMOTHERAPY FOLLOW-UP EXAMINATION: ICD-10-CM

## 2019-02-20 PROCEDURE — 99215 OFFICE O/P EST HI 40 MIN: CPT | Performed by: INTERNAL MEDICINE

## 2019-02-20 NOTE — PROGRESS NOTES
VERÓNICA     Kd Brown is a 39year old female here for f/u of Marginal zone lymphoma (hcc)  (primary encounter diagnosis)  Chemotherapy follow-up examination     Here today for follow up s/p Cycle 6 Bendamustine Rituxan.      Some fatigue week after Cholecalciferol (VITAMIN D) 1000 units Oral Tab Take 1,000 Units by mouth. Disp:  Rfl:    Biotin 1000 MCG Oral Tab Take 1,000 mcg by mouth daily. Disp:  Rfl:    Multiple Vitamins-Minerals (MULTI-VITAMIN/MINERALS) Oral Tab Take 1 tablet by mouth daily.  Jesse Cedeno file    Relationships      Social connections:        Talks on phone: Not on file        Gets together: Not on file        Attends Orthodoxy service: Not on file        Active member of club or organization: Not on file        Attends meetings of clubs or Oropharynx is clear and moist. No oropharyngeal exudate. Eyes: Conjunctivae and EOM are normal. Pupils are equal, round, and reactive to light. No scleral icterus. Neck: Normal range of motion.    Cardiovascular: Normal rate, regular rhythm and normal h examination    Patient with stage IV marginal zone lymphoma dx in November 2015. Hep B and Hep C. The patient continues to be asymptomatic w/o any significant cytopenias. CT with slow progression of disease and slight progression of axillary LAD. of marginal zone lymphoma with skin involvement.   The patient was diagnosed with stage IV marginal zone lymphoma in 2015 and is status post chemotherapy with   bendamustine and Rituxan               C85.80 Other specified types of non-hodgkin lymphoma, uns The increased splenic activity has also resolved. The uterus is enlarged with increased activity centrally. This is likely physiologic endometrial activity,   please correlate with the phase of the menstrual cycle.   There has been interval resolution of Approved by (CST): Edwar Long MD on 2/18/2019 at 21:06

## 2019-03-22 ENCOUNTER — HOSPITAL ENCOUNTER (OUTPATIENT)
Dept: MAMMOGRAPHY | Facility: HOSPITAL | Age: 44
Discharge: HOME | End: 2019-03-22
Attending: OBSTETRICS & GYNECOLOGY
Payer: COMMERCIAL

## 2019-03-22 DIAGNOSIS — R92.0 MAMMOGRAPHIC MICROCALCIFICATION FOUND ON DIAGNOSTIC IMAGING OF BREAST: ICD-10-CM

## 2019-03-22 DIAGNOSIS — R92.0 MICROCALCIFICATIONS OF THE BREAST: ICD-10-CM

## 2019-03-22 PROCEDURE — 77066 DX MAMMO INCL CAD BI: CPT | Performed by: OBSTETRICS & GYNECOLOGY

## 2019-03-22 PROCEDURE — 77062 BREAST TOMOSYNTHESIS BI: CPT | Performed by: OBSTETRICS & GYNECOLOGY

## 2019-03-26 ENCOUNTER — OFFICE VISIT (OUTPATIENT)
Dept: HEMATOLOGY/ONCOLOGY | Facility: HOSPITAL | Age: 44
End: 2019-03-26
Attending: NURSE PRACTITIONER
Payer: COMMERCIAL

## 2019-03-26 DIAGNOSIS — Z85.72 PERSONAL HISTORY OF NON-HODGKIN LYMPHOMAS: ICD-10-CM

## 2019-03-26 DIAGNOSIS — Z71.9 COUNSELING, UNSPECIFIED: ICD-10-CM

## 2019-03-26 DIAGNOSIS — Z08 ENCOUNTER FOR FOLLOW-UP EXAMINATION AFTER COMPLETED TREATMENT FOR MALIGNANT NEOPLASM: Primary | ICD-10-CM

## 2019-03-26 PROCEDURE — 99215 OFFICE O/P EST HI 40 MIN: CPT | Performed by: NURSE PRACTITIONER

## 2019-03-27 NOTE — PROGRESS NOTES
I met with Aquilino for a Survivorship Clinic visit to provide a survivorship care plan (SCP) and information related to post-treatment care. She came to the visit alone. She has a diagnosis of marginal zone lymphoma diagnosed in 11/2015.   The disease was prior to appointment. Reviewed Schedule of other clinic visits with Primary Care and specialists: Dr. Yesi Maria will continue to manage all general health care recommended for age and gender including cancer screening tests.   Dr. Roseann Nash will continu

## 2019-03-30 ENCOUNTER — TELEPHONE (OUTPATIENT)
Dept: HEMATOLOGY/ONCOLOGY | Facility: HOSPITAL | Age: 44
End: 2019-03-30

## 2019-03-30 NOTE — TELEPHONE ENCOUNTER
Patient called from an urgent care where she had taken her father with congestive heart failure. She complained of chest pain with breathing times 2 days. She called asking for a second opinion about whether or not she should have a CT scan.   Patient peifanio

## 2019-04-01 ENCOUNTER — TELEPHONE (OUTPATIENT)
Dept: HEMATOLOGY/ONCOLOGY | Facility: HOSPITAL | Age: 44
End: 2019-04-01

## 2019-04-01 ENCOUNTER — OFFICE VISIT (OUTPATIENT)
Dept: HEMATOLOGY/ONCOLOGY | Facility: HOSPITAL | Age: 44
End: 2019-04-01
Attending: INTERNAL MEDICINE
Payer: COMMERCIAL

## 2019-04-01 VITALS
TEMPERATURE: 98 F | SYSTOLIC BLOOD PRESSURE: 134 MMHG | WEIGHT: 122 LBS | DIASTOLIC BLOOD PRESSURE: 78 MMHG | OXYGEN SATURATION: 100 % | BODY MASS INDEX: 22.45 KG/M2 | RESPIRATION RATE: 16 BRPM | HEART RATE: 57 BPM | HEIGHT: 62 IN

## 2019-04-01 DIAGNOSIS — I26.99 OTHER ACUTE PULMONARY EMBOLISM WITHOUT ACUTE COR PULMONALE (HCC): ICD-10-CM

## 2019-04-01 DIAGNOSIS — C85.80 MARGINAL ZONE LYMPHOMA (HCC): Primary | ICD-10-CM

## 2019-04-01 PROCEDURE — 99213 OFFICE O/P EST LOW 20 MIN: CPT | Performed by: NURSE PRACTITIONER

## 2019-04-01 NOTE — PROGRESS NOTES
VERÓNICA     Phyllis Russell is a 38year old female here for f/u of Marginal zone lymphoma (hcc)  (primary encounter diagnosis)     S/p Cycle 6 Bendamustine Rituxan. Some fatigue week after treatment. Feeling ok.         Here today found to have PE ye Pack Take 15mg twice daily for 3 weeks, then 20mg daily with dinner Disp:  Rfl:    Loratadine-Pseudoephedrine (CLARITIN-D 12 HOUR OR) Take by mouth.  Disp:  Rfl:    metRONIDAZOLE 500 MG Oral Tab metronidazole 500 mg tablet Disp:  Rfl:    LEVOTHYROXINE SODIU session: Not on file      Stress: Not on file    Relationships      Social connections:        Talks on phone: Not on file        Gets together: Not on file        Attends [employer] service: Not on file        Active member of club or organization: Not on No distress. HENT:   Head: Normocephalic and atraumatic. Mouth/Throat: Oropharynx is clear and moist. No oropharyngeal exudate. Eyes: Conjunctivae and EOM are normal. Pupils are equal, round, and reactive to light. No scleral icterus.    Neck: Normal zone lymphoma (hcc)  (primary encounter diagnosis)    Patient with stage IV marginal zone lymphoma dx in November 2015. Hep B and Hep C. The patient continues to be asymptomatic w/o any significant cytopenias.       CT with slow progression of disease a

## 2019-04-01 NOTE — TELEPHONE ENCOUNTER
Amaury Neri calling with chest pain when she takes a deep breath in. She was in urgent care over the weekend and was admitted over night and prescribed xerelto. She would like to talk with Dr David Pa.  She said her chest pain is about a 5. maría

## 2019-04-01 NOTE — TELEPHONE ENCOUNTER
Toxicities: Patient under surveillance for NHL. Pt received Bendamustine & Rituxan Sept 2018 - 1/25/2019. Pt had PET/CT on 2/18/2019 which showed complete response. She last saw Dr Deedee Moses on 2/20/2019.  She received her survivorship care plan on 3/26/2019

## 2019-04-03 ENCOUNTER — TELEPHONE (OUTPATIENT)
Dept: HEMATOLOGY/ONCOLOGY | Facility: HOSPITAL | Age: 44
End: 2019-04-03

## 2019-04-03 NOTE — TELEPHONE ENCOUNTER
Toxicities:  Rivaroxaban 15 mg (po BID po BID x 3 weeks & then 20 mg po daily)  Pt started on 3/3/19649 for PE    Blood in mucus & Nose     Blood in mucus & Nose: Kiana Bynum reports when she woke up this morning she cleared her throat of phlegm and it was bright red. No blood clots in the mucus. No bleeding seen in her mouth. She also reports when she blew her nose she saw bright red blood streaks in her mucus. She has not seen anymore blood since this morning. She denies epistaxis, hematuria, hematochezia or melena. No unexplained bruises.)    I told Lois Last she should continue to monitor herself and call the office if she sees anymore blood or unexplained bruises. I told her I would update FRANCO Curran & Diogenes Shaw RN.

## 2019-04-03 NOTE — TELEPHONE ENCOUNTER
Sadia Bahena is on xarelto and this morning her flem was bloody and she had blood within her nose. It went away as time went on but she didn't know if this was due to the meds.  Please advise

## 2019-04-05 PROBLEM — I26.99 OTHER PULMONARY EMBOLISM WITHOUT ACUTE COR PULMONALE (HCC): Status: ACTIVE | Noted: 2019-04-05

## 2019-04-16 NOTE — TELEPHONE ENCOUNTER
Elvira calling for refill on rivaroxaban (XARELO) 20 MG Oral Tablet Therapy Pack.  [place] and not [place] until May 1st. maría

## 2019-04-17 NOTE — TELEPHONE ENCOUNTER
Melody Avila would like a call back from Megan Robbins 82 the nurse she just wants to make sure they are on the same page in reference to her medication since she is going [place] the country, Please advise

## 2019-05-20 ENCOUNTER — LAB ENCOUNTER (OUTPATIENT)
Dept: LAB | Age: 44
End: 2019-05-20
Attending: INTERNAL MEDICINE
Payer: COMMERCIAL

## 2019-05-20 DIAGNOSIS — C85.80 MARGINAL ZONE LYMPHOMA (HCC): ICD-10-CM

## 2019-05-20 PROCEDURE — 80053 COMPREHEN METABOLIC PANEL: CPT

## 2019-05-20 PROCEDURE — 36415 COLL VENOUS BLD VENIPUNCTURE: CPT

## 2019-05-20 PROCEDURE — 85025 COMPLETE CBC W/AUTO DIFF WBC: CPT

## 2019-05-20 PROCEDURE — 83615 LACTATE (LD) (LDH) ENZYME: CPT

## 2019-05-22 ENCOUNTER — OFFICE VISIT (OUTPATIENT)
Dept: HEMATOLOGY/ONCOLOGY | Facility: HOSPITAL | Age: 44
End: 2019-05-22
Attending: INTERNAL MEDICINE
Payer: COMMERCIAL

## 2019-05-22 VITALS
SYSTOLIC BLOOD PRESSURE: 116 MMHG | HEART RATE: 59 BPM | DIASTOLIC BLOOD PRESSURE: 72 MMHG | TEMPERATURE: 98 F | OXYGEN SATURATION: 100 % | RESPIRATION RATE: 16 BRPM

## 2019-05-22 DIAGNOSIS — I26.99 OTHER ACUTE PULMONARY EMBOLISM WITHOUT ACUTE COR PULMONALE (HCC): ICD-10-CM

## 2019-05-22 DIAGNOSIS — Z51.81 ENCOUNTER FOR MEDICATION MONITORING: ICD-10-CM

## 2019-05-22 DIAGNOSIS — C85.80 MARGINAL ZONE LYMPHOMA (HCC): Primary | ICD-10-CM

## 2019-05-22 PROCEDURE — 99214 OFFICE O/P EST MOD 30 MIN: CPT | Performed by: INTERNAL MEDICINE

## 2019-05-22 NOTE — PROGRESS NOTES
VERÓNICA Steinberg is a 38year old female here for f/u of Marginal zone lymphoma (hcc)  (primary encounter diagnosis)  Other acute pulmonary embolism without acute cor pulmonale (hcc)  Encounter for medication monitoring     LMP in February. metronidazole 500 mg tablet Disp:  Rfl:    LEVOTHYROXINE SODIUM 75 MCG Oral Tab TAKE 1 TABLET(75 MCG) BY MOUTH DAILY Disp: 90 tablet Rfl: 3   Cholecalciferol (VITAMIN D) 1000 units Oral Tab Take 1,000 Units by mouth.  Disp:  Rfl:    Biotin 1000 MCG Oral Tab regular rhythm and normal heart sounds. No murmur heard. Pulmonary/Chest: Effort normal and breath sounds normal. No respiratory distress. She has no wheezes. Abdominal: Soft. Bowel sounds are normal. She exhibits no distension.  There is no tenderness months total, this will be in July. Will order D dimer for July and if nl, will d/c xarelto. No orders of the defined types were placed in this encounter.           Results From Past 48 Hours:  No results found for this or any previous visit (from the 17.4    CALCIUM      8.5 - 10.1 mg/dL 9.5 9.4   CALCULATED OSMOLALITY      275 - 295 mOsm/kg 291    GFR, Non-      >=60 82    GFR, -American      >=60 94    ALT (SGPT)      13 - 56 U/L 18 17   AST (SGOT)      15 - 37 U/L 18 16   IVANIA

## 2019-05-23 ENCOUNTER — TELEPHONE (OUTPATIENT)
Dept: HEMATOLOGY/ONCOLOGY | Facility: HOSPITAL | Age: 44
End: 2019-05-23

## 2019-05-23 NOTE — TELEPHONE ENCOUNTER
Peter Seen calling asking if it's ok that she has a filling done on 5/30. Ok to leave message.  maría

## 2019-06-17 ENCOUNTER — TELEPHONE (OUTPATIENT)
Dept: HEMATOLOGY/ONCOLOGY | Facility: HOSPITAL | Age: 44
End: 2019-06-17

## 2019-06-17 NOTE — TELEPHONE ENCOUNTER
Pt requesting a call back to discuss denial of ct scan. Pt scheduled for f/u on 7/1. pls call.  Thank you

## 2019-06-18 NOTE — TELEPHONE ENCOUNTER
Called pt back. July appt is for f/u on PE and Xarelto. She will go for blood work prior to July visit. CT scan was denied d/t being too early. CT scan is for follow up of lymphoma and is not due til Aug. No further questions at present.

## 2019-06-26 ENCOUNTER — APPOINTMENT (OUTPATIENT)
Dept: LAB | Age: 44
End: 2019-06-26
Attending: INTERNAL MEDICINE
Payer: COMMERCIAL

## 2019-06-26 DIAGNOSIS — I26.99 OTHER ACUTE PULMONARY EMBOLISM WITHOUT ACUTE COR PULMONALE (HCC): ICD-10-CM

## 2019-06-26 PROCEDURE — 36415 COLL VENOUS BLD VENIPUNCTURE: CPT

## 2019-06-26 PROCEDURE — 85379 FIBRIN DEGRADATION QUANT: CPT

## 2019-07-01 ENCOUNTER — OFFICE VISIT (OUTPATIENT)
Dept: HEMATOLOGY/ONCOLOGY | Facility: HOSPITAL | Age: 44
End: 2019-07-01
Attending: INTERNAL MEDICINE
Payer: COMMERCIAL

## 2019-07-01 VITALS
OXYGEN SATURATION: 100 % | HEIGHT: 62 IN | DIASTOLIC BLOOD PRESSURE: 68 MMHG | TEMPERATURE: 97 F | BODY MASS INDEX: 23.37 KG/M2 | WEIGHT: 127 LBS | SYSTOLIC BLOOD PRESSURE: 126 MMHG | HEART RATE: 50 BPM | RESPIRATION RATE: 16 BRPM

## 2019-07-01 DIAGNOSIS — C85.80 MARGINAL ZONE LYMPHOMA (HCC): Primary | ICD-10-CM

## 2019-07-01 DIAGNOSIS — I26.99 OTHER ACUTE PULMONARY EMBOLISM WITHOUT ACUTE COR PULMONALE (HCC): ICD-10-CM

## 2019-07-01 PROCEDURE — 99214 OFFICE O/P EST MOD 30 MIN: CPT | Performed by: INTERNAL MEDICINE

## 2019-07-01 NOTE — PATIENT INSTRUCTIONS
Shoulder and Upper Back Stretch  To start, stand tall with your ears, shoulders, and hips in line. Your feet should be slightly apart, positioned just under your hips. Focus your eyes directly in front of you.   this position for a few seconds bef To start, sit in a chair with your feet flat on the floor. Shift your weight slightly forward to avoid rounding your back. Relax. Keep your ears, shoulders, and hips aligned:  · Raise your arms to shoulder height, elbows bent and palms forward.   · Move you · Rest the back of your left hand against your lower back. Place your right palm on the top of your head. · Gently pull your head forward and down until you feel a stretch in the muscles in the back of your neck. Don’t force the motion.   · Hold for 20 sec

## 2019-07-01 NOTE — PROGRESS NOTES
HPI   Mehran Adams is a 38year old female here for f/u of Marginal zone lymphoma (hcc)  (primary encounter diagnosis)  Other acute pulmonary embolism without acute cor pulmonale (hcc)     Took last xarelto today.     States on the L neck feels sor Vitamins-Minerals (MULTI-VITAMIN/MINERALS) Oral Tab Take 1 tablet by mouth daily.  Disp:  Rfl:      Allergies:   No Known Allergies    Past Medical History:   Diagnosis Date   • Heart murmur     As a child   • [facility] 2015    non- Hodgkins Lymphoma palpable lymphadenopathy throughout in the cervical, supraclavicular, axillary, or inguinal regions.   Psych/Depression: normal.        ASSESSMENT/PLAN:   Marginal zone lymphoma (hcc)  (primary encounter diagnosis)  Other acute pulmonary embolism without ac 31.0 - 37.0 g/dL  34.6    RDW-SD      35.1 - 46.3 fL  43.8    RDW      11.0 - 15.0 %  13.4    Platelet Count      675.2 - 450.0 10(3)uL  95.0 (L)    Prelim Neutrophil Abs      1.50 - 7.70 x10 (3) uL  3.18    Neutrophils Absolute      1.50 - 7.70 x10(3) uL or shortness  of breath. COMPARISON STUDY: None available. TECHNIQUE:  Routine pulmonary CTA was performed following IV administration of 80 mL of Isovue-370. Additional 3D series, including MPR and MIP, were created and submitted submitted for review. field-of-view and is  incompletely evaluated. Please see above for details and additional information.

## 2019-07-15 ENCOUNTER — APPOINTMENT (OUTPATIENT)
Dept: HEMATOLOGY/ONCOLOGY | Facility: HOSPITAL | Age: 44
End: 2019-07-15
Attending: INTERNAL MEDICINE
Payer: COMMERCIAL

## 2019-07-30 ENCOUNTER — TELEPHONE (OUTPATIENT)
Dept: HEMATOLOGY/ONCOLOGY | Facility: HOSPITAL | Age: 44
End: 2019-07-30

## 2019-07-30 NOTE — TELEPHONE ENCOUNTER
Bhupendra Forth calling asking about her P/A FOR SCAN.  [address] [facility] [address]

## 2019-08-01 ENCOUNTER — TELEPHONE (OUTPATIENT)
Dept: HEMATOLOGY/ONCOLOGY | Facility: HOSPITAL | Age: 44
End: 2019-08-01

## 2019-08-01 NOTE — TELEPHONE ENCOUNTER
Patient stated she have called 4 times regarding authorization and she have an appointment on 8/14/19 with Dr. Grey Worley and need to know will she be authorized to take her CT Scan before her appointment, please advise.

## 2019-08-11 ENCOUNTER — LAB ENCOUNTER (OUTPATIENT)
Dept: LAB | Facility: HOSPITAL | Age: 44
End: 2019-08-11
Attending: INTERNAL MEDICINE
Payer: COMMERCIAL

## 2019-08-11 ENCOUNTER — HOSPITAL ENCOUNTER (OUTPATIENT)
Dept: CT IMAGING | Facility: HOSPITAL | Age: 44
Discharge: HOME | End: 2019-08-11
Attending: INTERNAL MEDICINE
Payer: COMMERCIAL

## 2019-08-11 DIAGNOSIS — C85.80 MARGINAL ZONE LYMPHOMA (HCC): ICD-10-CM

## 2019-08-11 DIAGNOSIS — C83.90 NON-FOLLICULAR LYMPHOMA, UNSPECIFIED BODY REGION, UNSPECIFIED NON-FOLLICULAR LYMPHOMA TYPE (HCC): ICD-10-CM

## 2019-08-11 LAB
ALBUMIN SERPL-MCNC: 3.7 G/DL (ref 3.4–5)
ALBUMIN/GLOB SERPL: 1.3 {RATIO} (ref 1–2)
ALP LIVER SERPL-CCNC: 71 U/L (ref 37–98)
ALT SERPL-CCNC: 18 U/L (ref 13–56)
ANION GAP SERPL CALC-SCNC: 5 MMOL/L (ref 0–18)
AST SERPL-CCNC: 11 U/L (ref 15–37)
BASOPHILS # BLD AUTO: 0.01 X10(3) UL (ref 0–0.2)
BASOPHILS NFR BLD AUTO: 0.2 %
BILIRUB SERPL-MCNC: 0.7 MG/DL (ref 0.1–2)
BUN BLD-MCNC: 15 MG/DL (ref 7–18)
BUN/CREAT SERPL: 15.6 (ref 10–20)
CALCIUM BLD-MCNC: 9.1 MG/DL (ref 8.5–10.1)
CHLORIDE SERPL-SCNC: 110 MMOL/L (ref 98–112)
CO2 SERPL-SCNC: 29 MMOL/L (ref 21–32)
CREAT BLD-MCNC: 0.96 MG/DL (ref 0.55–1.02)
DEPRECATED RDW RBC AUTO: 43.3 FL (ref 35.1–46.3)
EOSINOPHIL # BLD AUTO: 0.08 X10(3) UL (ref 0–0.7)
EOSINOPHIL NFR BLD AUTO: 1.8 %
ERYTHROCYTE [DISTWIDTH] IN BLOOD BY AUTOMATED COUNT: 16.6 % (ref 11–15)
GLOBULIN PLAS-MCNC: 2.8 G/DL (ref 2.8–4.4)
GLUCOSE BLD-MCNC: 64 MG/DL (ref 70–99)
HCT VFR BLD AUTO: 35.1 % (ref 35–48)
HGB BLD-MCNC: 12.7 G/DL (ref 12–16)
IMM GRANULOCYTES # BLD AUTO: 0.01 X10(3) UL (ref 0–1)
IMM GRANULOCYTES NFR BLD: 0.2 %
LDH SERPL L TO P-CCNC: 160 U/L (ref 84–246)
LYMPHOCYTES # BLD AUTO: 0.39 X10(3) UL (ref 1–4)
LYMPHOCYTES NFR BLD AUTO: 8.9 %
M PROTEIN MFR SERPL ELPH: 6.5 G/DL (ref 6.4–8.2)
MCH RBC QN AUTO: 34.5 PG (ref 26–34)
MCHC RBC AUTO-ENTMCNC: 36.2 G/DL (ref 31–37)
MCV RBC AUTO: 95.4 FL (ref 80–100)
MONOCYTES # BLD AUTO: 0.45 X10(3) UL (ref 0.1–1)
MONOCYTES NFR BLD AUTO: 10.2 %
NEUTROPHILS # BLD AUTO: 3.46 X10 (3) UL (ref 1.5–7.7)
NEUTROPHILS # BLD AUTO: 3.46 X10(3) UL (ref 1.5–7.7)
NEUTROPHILS NFR BLD AUTO: 78.7 %
OSMOLALITY SERPL CALC.SUM OF ELEC: 297 MOSM/KG (ref 275–295)
PATIENT FASTING: YES
PLATELET # BLD AUTO: 122 10(3)UL (ref 150–450)
POTASSIUM SERPL-SCNC: 4.6 MMOL/L (ref 3.5–5.1)
RBC # BLD AUTO: 3.68 X10(6)UL (ref 3.8–5.3)
SODIUM SERPL-SCNC: 144 MMOL/L (ref 136–145)
TSI SER-ACNC: 1.4 MIU/ML (ref 0.36–3.74)
WBC # BLD AUTO: 4.4 X10(3) UL (ref 4–11)

## 2019-08-11 PROCEDURE — 74177 CT ABD & PELVIS W/CONTRAST: CPT | Performed by: INTERNAL MEDICINE

## 2019-08-11 PROCEDURE — 36415 COLL VENOUS BLD VENIPUNCTURE: CPT

## 2019-08-11 PROCEDURE — 71260 CT THORAX DX C+: CPT | Performed by: INTERNAL MEDICINE

## 2019-08-11 PROCEDURE — 84443 ASSAY THYROID STIM HORMONE: CPT

## 2019-08-11 PROCEDURE — 85025 COMPLETE CBC W/AUTO DIFF WBC: CPT

## 2019-08-11 PROCEDURE — 83615 LACTATE (LD) (LDH) ENZYME: CPT

## 2019-08-11 PROCEDURE — 80053 COMPREHEN METABOLIC PANEL: CPT

## 2019-08-11 PROCEDURE — 70491 CT SOFT TISSUE NECK W/DYE: CPT | Performed by: INTERNAL MEDICINE

## 2019-08-14 ENCOUNTER — OFFICE VISIT (OUTPATIENT)
Dept: HEMATOLOGY/ONCOLOGY | Facility: HOSPITAL | Age: 44
End: 2019-08-14
Attending: INTERNAL MEDICINE
Payer: COMMERCIAL

## 2019-08-14 VITALS
TEMPERATURE: 98 F | BODY MASS INDEX: 22.45 KG/M2 | SYSTOLIC BLOOD PRESSURE: 118 MMHG | OXYGEN SATURATION: 100 % | RESPIRATION RATE: 16 BRPM | HEIGHT: 62 IN | WEIGHT: 122 LBS | HEART RATE: 57 BPM | DIASTOLIC BLOOD PRESSURE: 77 MMHG

## 2019-08-14 DIAGNOSIS — C85.80 MARGINAL ZONE LYMPHOMA (HCC): Primary | ICD-10-CM

## 2019-08-14 PROBLEM — Z09 CHEMOTHERAPY FOLLOW-UP EXAMINATION: Status: RESOLVED | Noted: 2018-10-03 | Resolved: 2019-08-14

## 2019-08-14 PROCEDURE — 99214 OFFICE O/P EST MOD 30 MIN: CPT | Performed by: INTERNAL MEDICINE

## 2019-08-14 NOTE — PROGRESS NOTES
HPI   Milli Suarez is a 38year old female here for f/u of Marginal zone lymphoma (hcc)  (primary encounter diagnosis)       States having hot flashes mostly every night and not sleeping well. Still no menses. Some fatigue.       Denies fever Date   • CHEMOTHERAPY  9/2018-1/2019    lymphoma   • EXCIS UTERINE FIBROID,ABD THE [facility]  2012   • NM LYMPHOSCINTIGRAPHY  (CPT= 20794) Right 2015    right sided neck lymph nodes removed   • OTHER SURGICAL HISTORY      fibroids removed      Social History    T continues to be asymptomatic w/o any significant cytopenias. CT with slow progression of disease and slight progression of axillary LAD.   D/w patient that at this point given slow steady progression, consider treatment vs continue observation with janet 0. 01 0.01   Neutrophils %      % 78.7 80.6   Lymphocytes %      % 8.9 6.9   Monocytes %      % 10.2 10.2   Eosinophils %      % 1.8 1.5   Basophils %      % 0.2 0.5   Immature Granulocyte %      % 0.2 0.3   Glucose      70 - 99 mg/dL 64 (L) 67 (L)   Sodium iterative reconstruction technique for dose reduction was used. FINDINGS:          Neck:  NASO-/OROPHARYNX:             No mass or significant asymmetry. PARAPHARANGEAL AREA:      No mass or significant asymmetry.     TONGUE:          No visible based low density foci likely representing cysts. No enhancing renal lesion or hydronephrosis. STOMACH:       No gross mass or abnormality. BOWEL/MESENTERY:  No visible mass, obstruction, or bowel wall thickening.     ABDOMINAL WALL:      No mass or he

## 2019-11-09 ENCOUNTER — LAB ENCOUNTER (OUTPATIENT)
Dept: LAB | Age: 44
End: 2019-11-09
Attending: INTERNAL MEDICINE
Payer: COMMERCIAL

## 2019-11-09 DIAGNOSIS — C85.80 MARGINAL ZONE LYMPHOMA (HCC): ICD-10-CM

## 2019-11-09 PROCEDURE — 80053 COMPREHEN METABOLIC PANEL: CPT

## 2019-11-09 PROCEDURE — 36415 COLL VENOUS BLD VENIPUNCTURE: CPT

## 2019-11-09 PROCEDURE — 83615 LACTATE (LD) (LDH) ENZYME: CPT

## 2019-11-09 PROCEDURE — 85025 COMPLETE CBC W/AUTO DIFF WBC: CPT

## 2019-11-12 DIAGNOSIS — C85.80 MARGINAL ZONE LYMPHOMA (HCC): Primary | ICD-10-CM

## 2019-11-13 ENCOUNTER — OFFICE VISIT (OUTPATIENT)
Dept: HEMATOLOGY/ONCOLOGY | Facility: HOSPITAL | Age: 44
End: 2019-11-13
Attending: INTERNAL MEDICINE
Payer: COMMERCIAL

## 2019-11-13 VITALS
HEART RATE: 55 BPM | DIASTOLIC BLOOD PRESSURE: 66 MMHG | TEMPERATURE: 98 F | RESPIRATION RATE: 16 BRPM | BODY MASS INDEX: 22.26 KG/M2 | HEIGHT: 62 IN | SYSTOLIC BLOOD PRESSURE: 118 MMHG | WEIGHT: 121 LBS | OXYGEN SATURATION: 100 %

## 2019-11-13 DIAGNOSIS — C85.80 MARGINAL ZONE LYMPHOMA (HCC): Primary | ICD-10-CM

## 2019-11-13 PROCEDURE — 99214 OFFICE O/P EST MOD 30 MIN: CPT | Performed by: INTERNAL MEDICINE

## 2019-11-13 NOTE — PROGRESS NOTES
HPI   Jane Pichardo is a 38year old female here for f/u of Marginal zone lymphoma (hcc)  (primary encounter diagnosis)     Exercising 6x a week. States will be doing a body building contest in April.   Wondering if OK for diet changes and if can d History:   Diagnosis Date   • Heart murmur     As a child   • Lymphoma ([facility] 2015    non- Hodgkins Lymphoma   • Vertigo 2008     Past Surgical History:   Procedure Laterality Date   • CHEMOTHERAPY  9/2018-1/2019    lymphoma   • EXCIS UTERINE FIBROID,ABD AP ASSESSMENT/PLAN:   Marginal zone lymphoma (hcc)  (primary encounter diagnosis)    Patient with stage IV marginal zone lymphoma dx in November 2015. Hep B and Hep C. The patient continues to be asymptomatic w/o any significant cytopenias.       CT with Monocytes Absolute      0.10 - 1.00 x10(3) uL 0.55   Eosinophils Absolute      0.00 - 0.70 x10(3) uL 0.10   Basophils Absolute      0.00 - 0.20 x10(3) uL 0.02   Immature Granulocyte Absolute      0.00 - 1.00 x10(3) uL 0.01   Neutrophils %      % 69.2   L Absolute      0.10 - 1.00 x10(3) uL 0.45 0.40   Eosinophils Absolute      0.00 - 0.70 x10(3) uL 0.08 0.06   Basophils Absolute      0.00 - 0.20 x10(3) uL 0.01 0.02   Immature Granulocyte Absolute      0.00 - 1.00 x10(3) uL 0.01 0.01   Neutrophils %      %

## 2020-02-08 ENCOUNTER — LAB ENCOUNTER (OUTPATIENT)
Dept: LAB | Facility: HOSPITAL | Age: 45
End: 2020-02-08
Attending: INTERNAL MEDICINE
Payer: COMMERCIAL

## 2020-02-08 ENCOUNTER — HOSPITAL ENCOUNTER (OUTPATIENT)
Dept: CT IMAGING | Facility: HOSPITAL | Age: 45
Discharge: HOME | End: 2020-02-08
Attending: INTERNAL MEDICINE
Payer: COMMERCIAL

## 2020-02-08 DIAGNOSIS — C85.80 MARGINAL ZONE LYMPHOMA (HCC): ICD-10-CM

## 2020-02-08 LAB
ALBUMIN SERPL-MCNC: 4 G/DL (ref 3.4–5)
ALBUMIN/GLOB SERPL: 1.4 {RATIO} (ref 1–2)
ALP LIVER SERPL-CCNC: 87 U/L (ref 37–98)
ALT SERPL-CCNC: 22 U/L (ref 13–56)
ANION GAP SERPL CALC-SCNC: 6 MMOL/L (ref 0–18)
AST SERPL-CCNC: 16 U/L (ref 15–37)
BASOPHILS # BLD AUTO: 0.02 X10(3) UL (ref 0–0.2)
BASOPHILS NFR BLD AUTO: 0.4 %
BILIRUB SERPL-MCNC: 0.9 MG/DL (ref 0.1–2)
BUN BLD-MCNC: 22 MG/DL (ref 7–18)
BUN/CREAT SERPL: 23.2 (ref 10–20)
CALCIUM BLD-MCNC: 9 MG/DL (ref 8.5–10.1)
CHLORIDE SERPL-SCNC: 105 MMOL/L (ref 98–112)
CO2 SERPL-SCNC: 28 MMOL/L (ref 21–32)
CREAT BLD-MCNC: 0.95 MG/DL (ref 0.55–1.02)
CREAT BLD-MCNC: 1 MG/DL (ref 0.55–1.02)
DEPRECATED RDW RBC AUTO: 42.5 FL (ref 35.1–46.3)
EOSINOPHIL # BLD AUTO: 0.08 X10(3) UL (ref 0–0.7)
EOSINOPHIL NFR BLD AUTO: 1.7 %
ERYTHROCYTE [DISTWIDTH] IN BLOOD BY AUTOMATED COUNT: 13.5 % (ref 11–15)
GLOBULIN PLAS-MCNC: 2.9 G/DL (ref 2.8–4.4)
GLUCOSE BLD-MCNC: 88 MG/DL (ref 70–99)
HCT VFR BLD AUTO: 41.9 % (ref 35–48)
HGB BLD-MCNC: 14.4 G/DL (ref 12–16)
IMM GRANULOCYTES # BLD AUTO: 0.01 X10(3) UL (ref 0–1)
IMM GRANULOCYTES NFR BLD: 0.2 %
LDH SERPL L TO P-CCNC: 198 U/L (ref 84–246)
LYMPHOCYTES # BLD AUTO: 0.79 X10(3) UL (ref 1–4)
LYMPHOCYTES NFR BLD AUTO: 17.1 %
M PROTEIN MFR SERPL ELPH: 6.9 G/DL (ref 6.4–8.2)
MCH RBC QN AUTO: 30 PG (ref 26–34)
MCHC RBC AUTO-ENTMCNC: 34.4 G/DL (ref 31–37)
MCV RBC AUTO: 87.3 FL (ref 80–100)
MONOCYTES # BLD AUTO: 0.51 X10(3) UL (ref 0.1–1)
MONOCYTES NFR BLD AUTO: 11.1 %
NEUTROPHILS # BLD AUTO: 3.2 X10 (3) UL (ref 1.5–7.7)
NEUTROPHILS # BLD AUTO: 3.2 X10(3) UL (ref 1.5–7.7)
NEUTROPHILS NFR BLD AUTO: 69.5 %
OSMOLALITY SERPL CALC.SUM OF ELEC: 291 MOSM/KG (ref 275–295)
PATIENT FASTING Y/N/NP: YES
PLATELET # BLD AUTO: 136 10(3)UL (ref 150–450)
POTASSIUM SERPL-SCNC: 4.3 MMOL/L (ref 3.5–5.1)
RBC # BLD AUTO: 4.8 X10(6)UL (ref 3.8–5.3)
SODIUM SERPL-SCNC: 139 MMOL/L (ref 136–145)
WBC # BLD AUTO: 4.6 X10(3) UL (ref 4–11)

## 2020-02-08 PROCEDURE — 85025 COMPLETE CBC W/AUTO DIFF WBC: CPT

## 2020-02-08 PROCEDURE — 74177 CT ABD & PELVIS W/CONTRAST: CPT | Performed by: INTERNAL MEDICINE

## 2020-02-08 PROCEDURE — 36415 COLL VENOUS BLD VENIPUNCTURE: CPT

## 2020-02-08 PROCEDURE — 82565 ASSAY OF CREATININE: CPT

## 2020-02-08 PROCEDURE — 80053 COMPREHEN METABOLIC PANEL: CPT

## 2020-02-08 PROCEDURE — 71260 CT THORAX DX C+: CPT | Performed by: INTERNAL MEDICINE

## 2020-02-08 PROCEDURE — 83615 LACTATE (LD) (LDH) ENZYME: CPT

## 2020-02-08 PROCEDURE — 70491 CT SOFT TISSUE NECK W/DYE: CPT | Performed by: INTERNAL MEDICINE

## 2020-02-11 ENCOUNTER — OFFICE VISIT (OUTPATIENT)
Dept: HEMATOLOGY/ONCOLOGY | Facility: HOSPITAL | Age: 45
End: 2020-02-11
Attending: INTERNAL MEDICINE
Payer: COMMERCIAL

## 2020-02-11 VITALS
RESPIRATION RATE: 16 BRPM | DIASTOLIC BLOOD PRESSURE: 64 MMHG | BODY MASS INDEX: 21.9 KG/M2 | TEMPERATURE: 98 F | HEART RATE: 55 BPM | OXYGEN SATURATION: 100 % | SYSTOLIC BLOOD PRESSURE: 111 MMHG | WEIGHT: 119 LBS | HEIGHT: 62 IN

## 2020-02-11 DIAGNOSIS — C85.80 MARGINAL ZONE LYMPHOMA (HCC): Primary | ICD-10-CM

## 2020-02-11 PROCEDURE — 99213 OFFICE O/P EST LOW 20 MIN: CPT | Performed by: INTERNAL MEDICINE

## 2020-02-11 NOTE — PROGRESS NOTES
HPI   Jose Steinberg is a 38year old female here for f/u of Marginal zone lymphoma (hcc)  (primary encounter diagnosis)     Exercising 6x a week. States will be doing a body building contest in April.       Keeping diet as training and needs to be (MULTI-VITAMIN/MINERALS) Oral Tab Take 1 tablet by mouth daily.        Allergies:   No Known Allergies    Past Medical History:   Diagnosis Date   • Heart murmur     As a child   • Lymphoma ([facility] 2015    non- Hodgkins Lymphoma   • Vertigo 2008     Past Surg throughout in the cervical, supraclavicular, axillary, or inguinal regions.   Psych/Depression: normal.        ASSESSMENT/PLAN:   Marginal zone lymphoma (hcc)  (primary encounter diagnosis)    Patient with stage IV marginal zone lymphoma dx in November 2015 10(3)uL 136.0 (L) 113.0 (L)   Prelim Neutrophil Abs      1.50 - 7.70 x10 (3) uL 3.20 2.71   Neutrophils Absolute      1.50 - 7.70 x10(3) uL 3.20 2.71   Lymphocytes Absolute      1.00 - 4.00 x10(3) uL 0.79 (L) 0.52 (L)   Monocytes Absolute      0.10 - 1.00 (NON-STAND) SCANS(SKULL TO TOES) (THH=47146), 2/18/2019, 7:36. [place], [place] STN/CHST/ABD/PEL W CONTRAST (CPT=70491/18710/67066), 6/21/2018, 9:19.  [facility], CT STN/CHST/ABD/PEL W   CONTRAST (JEW=40858/525 micronodules are unchanged. No new or suspicious pulmonary nodule. Trachea and mainstem bronchi are patent. CHEST WALL: Normal. No mass or axillary adenopathy. OTHER: Stable heterogeneous enhancement right posterior inferior thyroid.    ABDOMEN/PELVIS F

## 2020-05-09 ENCOUNTER — LAB ENCOUNTER (OUTPATIENT)
Dept: LAB | Age: 45
End: 2020-05-09
Attending: INTERNAL MEDICINE
Payer: COMMERCIAL

## 2020-05-09 DIAGNOSIS — C85.80 MARGINAL ZONE LYMPHOMA (HCC): ICD-10-CM

## 2020-05-09 PROCEDURE — 36415 COLL VENOUS BLD VENIPUNCTURE: CPT

## 2020-05-09 PROCEDURE — 85025 COMPLETE CBC W/AUTO DIFF WBC: CPT

## 2020-05-09 PROCEDURE — 80053 COMPREHEN METABOLIC PANEL: CPT

## 2020-05-09 PROCEDURE — 83615 LACTATE (LD) (LDH) ENZYME: CPT

## 2020-05-12 ENCOUNTER — OFFICE VISIT (OUTPATIENT)
Dept: HEMATOLOGY/ONCOLOGY | Facility: HOSPITAL | Age: 45
End: 2020-05-12
Attending: INTERNAL MEDICINE
Payer: COMMERCIAL

## 2020-05-12 VITALS
BODY MASS INDEX: 21.16 KG/M2 | HEART RATE: 57 BPM | OXYGEN SATURATION: 100 % | SYSTOLIC BLOOD PRESSURE: 98 MMHG | HEIGHT: 62 IN | TEMPERATURE: 97 F | WEIGHT: 115 LBS | DIASTOLIC BLOOD PRESSURE: 64 MMHG | RESPIRATION RATE: 16 BRPM

## 2020-05-12 DIAGNOSIS — Z08 ENCOUNTER FOR FOLLOW-UP SURVEILLANCE OF LYMPHOMA: ICD-10-CM

## 2020-05-12 DIAGNOSIS — Z85.72 ENCOUNTER FOR FOLLOW-UP SURVEILLANCE OF LYMPHOMA: ICD-10-CM

## 2020-05-12 DIAGNOSIS — Z85.72 PERSONAL HISTORY OF NON-HODGKIN LYMPHOMAS: Primary | ICD-10-CM

## 2020-05-12 PROCEDURE — 99213 OFFICE O/P EST LOW 20 MIN: CPT | Performed by: INTERNAL MEDICINE

## 2020-05-12 NOTE — PROGRESS NOTES
VERÓNICA   Rashaun Leggett is a 54year old female here for f/u of Personal history of non-hodgkin lymphomas  (primary encounter diagnosis)  Encounter for follow-up surveillance of lymphoma     She has been working out at home. Working from home. As a child   • Lymphoma ([facility] [address] 2015    non- Hodgkins Lymphoma   • Vertigo 2008     Past Surgical History:   Procedure Laterality Date   • CHEMOTHERAPY  9/2018-1/2019    lymphoma   • EXCIS UTERINE FIBROID,ABD THE [facility]  2012   • NM LYMPHOSCINTIGRAPHY  (CPT= (primary encounter diagnosis)  Encounter for follow-up surveillance of lymphoma    Patient with stage IV marginal zone lymphoma dx in November 2015. Hep B and Hep C. The patient continues to be asymptomatic w/o any significant cytopenias.       CT with 1.50 - 7.70 x10(3) uL 2.95 3.20   Lymphocytes Absolute      1.00 - 4.00 x10(3) uL 1.18 0.79 (L)   Monocytes Absolute      0.10 - 1.00 x10(3) uL 0.58 0.51   Eosinophils Absolute      0.00 - 0.70 x10(3) uL 0.14 0.08   Basophils Absolute      0.00 - 0.20 x

## 2020-06-11 ENCOUNTER — HOSPITAL ENCOUNTER (OUTPATIENT)
Dept: MAMMOGRAPHY | Age: 45
Discharge: HOME | End: 2020-06-11
Attending: OBSTETRICS & GYNECOLOGY
Payer: COMMERCIAL

## 2020-06-11 DIAGNOSIS — Z12.31 ENCOUNTER FOR SCREENING MAMMOGRAM FOR MALIGNANT NEOPLASM OF BREAST: ICD-10-CM

## 2020-06-11 PROCEDURE — 77067 SCR MAMMO BI INCL CAD: CPT | Performed by: OBSTETRICS & GYNECOLOGY

## 2020-06-11 PROCEDURE — 77063 BREAST TOMOSYNTHESIS BI: CPT | Performed by: OBSTETRICS & GYNECOLOGY

## 2020-08-14 ENCOUNTER — HOSPITAL ENCOUNTER (OUTPATIENT)
Dept: CT IMAGING | Age: 45
Discharge: HOME | End: 2020-08-14
Attending: INTERNAL MEDICINE
Payer: COMMERCIAL

## 2020-08-14 DIAGNOSIS — Z85.72 ENCOUNTER FOR FOLLOW-UP SURVEILLANCE OF LYMPHOMA: ICD-10-CM

## 2020-08-14 DIAGNOSIS — Z08 ENCOUNTER FOR FOLLOW-UP SURVEILLANCE OF LYMPHOMA: ICD-10-CM

## 2020-08-14 DIAGNOSIS — Z85.72 PERSONAL HISTORY OF NON-HODGKIN LYMPHOMAS: ICD-10-CM

## 2020-08-14 LAB — CREAT BLD-MCNC: 0.9 MG/DL (ref 0.55–1.02)

## 2020-08-14 PROCEDURE — 82565 ASSAY OF CREATININE: CPT

## 2020-08-14 PROCEDURE — 71260 CT THORAX DX C+: CPT | Performed by: INTERNAL MEDICINE

## 2020-08-14 PROCEDURE — 74177 CT ABD & PELVIS W/CONTRAST: CPT | Performed by: INTERNAL MEDICINE

## 2020-08-14 PROCEDURE — 70491 CT SOFT TISSUE NECK W/DYE: CPT | Performed by: INTERNAL MEDICINE

## 2020-08-18 ENCOUNTER — LAB ENCOUNTER (OUTPATIENT)
Dept: LAB | Age: 45
End: 2020-08-18
Attending: INTERNAL MEDICINE
Payer: COMMERCIAL

## 2020-08-18 DIAGNOSIS — C85.80 MARGINAL ZONE LYMPHOMA (HCC): ICD-10-CM

## 2020-08-18 LAB
ALBUMIN SERPL-MCNC: 4 G/DL (ref 3.4–5)
ALBUMIN/GLOB SERPL: 1.3 {RATIO} (ref 1–2)
ALP LIVER SERPL-CCNC: 84 U/L (ref 39–100)
ALT SERPL-CCNC: 30 U/L (ref 13–56)
ANION GAP SERPL CALC-SCNC: 7 MMOL/L (ref 0–18)
AST SERPL-CCNC: 22 U/L (ref 15–37)
BASOPHILS # BLD AUTO: 0.02 X10(3) UL (ref 0–0.2)
BASOPHILS NFR BLD AUTO: 0.3 %
BILIRUB SERPL-MCNC: 1 MG/DL (ref 0.1–2)
BUN BLD-MCNC: 13 MG/DL (ref 7–18)
BUN/CREAT SERPL: 12.9 (ref 10–20)
CALCIUM BLD-MCNC: 9.4 MG/DL (ref 8.5–10.1)
CHLORIDE SERPL-SCNC: 105 MMOL/L (ref 98–112)
CO2 SERPL-SCNC: 27 MMOL/L (ref 21–32)
CREAT BLD-MCNC: 1.01 MG/DL (ref 0.55–1.02)
DEPRECATED RDW RBC AUTO: 42.6 FL (ref 35.1–46.3)
EOSINOPHIL # BLD AUTO: 0.1 X10(3) UL (ref 0–0.7)
EOSINOPHIL NFR BLD AUTO: 1.5 %
ERYTHROCYTE [DISTWIDTH] IN BLOOD BY AUTOMATED COUNT: 13.2 % (ref 11–15)
GLOBULIN PLAS-MCNC: 3 G/DL (ref 2.8–4.4)
GLUCOSE BLD-MCNC: 88 MG/DL (ref 70–99)
HCT VFR BLD AUTO: 41.6 % (ref 35–48)
HGB BLD-MCNC: 14.5 G/DL (ref 12–16)
IMM GRANULOCYTES # BLD AUTO: 0.01 X10(3) UL (ref 0–1)
IMM GRANULOCYTES NFR BLD: 0.2 %
LDH SERPL L TO P-CCNC: 204 U/L
LYMPHOCYTES # BLD AUTO: 1.28 X10(3) UL (ref 1–4)
LYMPHOCYTES NFR BLD AUTO: 19.7 %
M PROTEIN MFR SERPL ELPH: 7 G/DL (ref 6.4–8.2)
MCH RBC QN AUTO: 31.3 PG (ref 26–34)
MCHC RBC AUTO-ENTMCNC: 34.9 G/DL (ref 31–37)
MCV RBC AUTO: 89.8 FL (ref 80–100)
MONOCYTES # BLD AUTO: 0.46 X10(3) UL (ref 0.1–1)
MONOCYTES NFR BLD AUTO: 7.1 %
NEUTROPHILS # BLD AUTO: 4.64 X10 (3) UL (ref 1.5–7.7)
NEUTROPHILS # BLD AUTO: 4.64 X10(3) UL (ref 1.5–7.7)
NEUTROPHILS NFR BLD AUTO: 71.2 %
OSMOLALITY SERPL CALC.SUM OF ELEC: 288 MOSM/KG (ref 275–295)
PATIENT FASTING Y/N/NP: NO
PLATELET # BLD AUTO: 126 10(3)UL (ref 150–450)
POTASSIUM SERPL-SCNC: 4.8 MMOL/L (ref 3.5–5.1)
RBC # BLD AUTO: 4.63 X10(6)UL (ref 3.8–5.3)
SODIUM SERPL-SCNC: 139 MMOL/L (ref 136–145)
WBC # BLD AUTO: 6.5 X10(3) UL (ref 4–11)

## 2020-08-18 PROCEDURE — 83615 LACTATE (LD) (LDH) ENZYME: CPT

## 2020-08-18 PROCEDURE — 80053 COMPREHEN METABOLIC PANEL: CPT

## 2020-08-18 PROCEDURE — 85025 COMPLETE CBC W/AUTO DIFF WBC: CPT

## 2020-08-18 PROCEDURE — 36415 COLL VENOUS BLD VENIPUNCTURE: CPT

## 2020-08-20 ENCOUNTER — OFFICE VISIT (OUTPATIENT)
Dept: HEMATOLOGY/ONCOLOGY | Facility: HOSPITAL | Age: 45
End: 2020-08-20
Attending: INTERNAL MEDICINE
Payer: COMMERCIAL

## 2020-08-20 VITALS
SYSTOLIC BLOOD PRESSURE: 123 MMHG | TEMPERATURE: 98 F | OXYGEN SATURATION: 100 % | BODY MASS INDEX: 20.98 KG/M2 | HEIGHT: 62 IN | DIASTOLIC BLOOD PRESSURE: 69 MMHG | HEART RATE: 45 BPM | WEIGHT: 114 LBS | RESPIRATION RATE: 16 BRPM

## 2020-08-20 DIAGNOSIS — Z85.72 PERSONAL HISTORY OF NON-HODGKIN LYMPHOMAS: ICD-10-CM

## 2020-08-20 DIAGNOSIS — D69.6 THROMBOCYTOPENIA (HCC): ICD-10-CM

## 2020-08-20 DIAGNOSIS — C85.80 MARGINAL ZONE LYMPHOMA (HCC): Primary | ICD-10-CM

## 2020-08-20 DIAGNOSIS — Z85.72 ENCOUNTER FOR FOLLOW-UP SURVEILLANCE OF LYMPHOMA: ICD-10-CM

## 2020-08-20 DIAGNOSIS — Z08 ENCOUNTER FOR FOLLOW-UP SURVEILLANCE OF LYMPHOMA: ICD-10-CM

## 2020-08-20 PROCEDURE — 99213 OFFICE O/P EST LOW 20 MIN: CPT | Performed by: INTERNAL MEDICINE

## 2020-08-20 NOTE — PROGRESS NOTES
VERÓNICA   Manuel Anderson is a 54year old female here for f/u of Marginal zone lymphoma (hcc)  (primary encounter diagnosis)  Personal history of non-hodgkin lymphomas  Encounter for follow-up surveillance of lymphoma     She has been working out at [facility] [employer] (MULTI-VITAMIN/MINERALS) Oral Tab Take 1 tablet by mouth daily.        Allergies:   No Known Allergies    Past Medical History:   Diagnosis Date   • Heart murmur     As a child   • Lymphoma ([facility] [address] 2015    non- Hodgkins Lymphoma   • Vertigo 2008     Past Surg sounds. Extremities: Pedal pulses are present. No edema. Neurological: Grossly intact. Lymphatics: There is no palpable lymphadenopathy throughout in the cervical, supraclavicular, axillary, or inguinal regions.   Psych/Depression: normal.        ASSESS NECK CHST ABD PELV W CONTRA, 2/06/2016, 3:30 PM.  CT STN/CHST/ABD/PEL W CONTRAST (CPT=70491/75958/00128),   2/17/2017, 10:59 AM.  CT STN/CHST/ABD/PEL W CONTRAST (CPT=70491/51131/06316), 6/01/2017, 8:45 AM.  CT SOFT TISSUE OF NECK (CONTRAST ONLY) (CPT=70491 The parotid, submandibular, and thyroid glands are unremarkable. LYMPH NODES:            No pathological-appearing or enlarged lymph nodes. Surgical clips in the right neck presumably relate to betito dissection.   VASCULATURE:            Limited as compared with 11.8 cm previously). Multiple (at least 5-6) subcentimeter well-circumscribed hypodense splenic lesions appear grossly unchanged. ADRENALS:                   No defined mass or abnormal enlargement.     KIDNEYS:                       Symm Marcela Calabrese MD on 8/14/2020 at 10:20 AM       Finalized by (CST): Marcela Calabrese MD on 8/14/2020 at 10:42 AM            Component      Latest Ref Rng & Units 8/18/2020 5/9/2020   WBC      4.0 - 11.0 x10(3) uL 6.5 4.9   RBC      3.80 - 5.30 x10(6)uL 4. TOTAL PROTEIN      6.4 - 8.2 g/dL 7.0 6.6   Albumin      3.4 - 5.0 g/dL 4.0 3.9   Globulin      2.8 - 4.4 g/dL 3.0 2.7 (L)   A/G Ratio      1.0 - 2.0 1.3 1.4   Patient Fasting?        No No   LDH      84 - 246 U/L 204 158

## 2020-10-09 ENCOUNTER — LAB ENCOUNTER (OUTPATIENT)
Dept: LAB | Age: 45
End: 2020-10-09
Attending: INTERNAL MEDICINE
Payer: COMMERCIAL

## 2020-10-09 DIAGNOSIS — R79.89 LOW VITAMIN D LEVEL: ICD-10-CM

## 2020-10-09 DIAGNOSIS — Z13.220 ENCOUNTER FOR SCREENING FOR LIPID DISORDER: ICD-10-CM

## 2020-10-09 DIAGNOSIS — E03.9 HYPOTHYROIDISM, UNSPECIFIED TYPE: ICD-10-CM

## 2020-10-09 PROCEDURE — 82306 VITAMIN D 25 HYDROXY: CPT

## 2020-10-09 PROCEDURE — 80061 LIPID PANEL: CPT

## 2020-10-09 PROCEDURE — 84443 ASSAY THYROID STIM HORMONE: CPT

## 2020-10-09 PROCEDURE — 84439 ASSAY OF FREE THYROXINE: CPT

## 2020-10-09 PROCEDURE — 36415 COLL VENOUS BLD VENIPUNCTURE: CPT

## 2020-11-18 ENCOUNTER — TELEPHONE (OUTPATIENT)
Dept: HEMATOLOGY/ONCOLOGY | Facility: HOSPITAL | Age: 45
End: 2020-11-18

## 2020-11-18 DIAGNOSIS — C85.80 MARGINAL ZONE LYMPHOMA (HCC): Primary | ICD-10-CM

## 2020-11-18 NOTE — TELEPHONE ENCOUNTER
Please call patient to discuss her labs, she ahs an appointment for 11/21/20, but they gave her a hard time about the orders, she see Dr. Mark Bolus next week as well.

## 2020-11-21 ENCOUNTER — LAB ENCOUNTER (OUTPATIENT)
Dept: LAB | Age: 45
End: 2020-11-21
Attending: INTERNAL MEDICINE
Payer: COMMERCIAL

## 2020-11-21 DIAGNOSIS — C85.80 MARGINAL ZONE LYMPHOMA (HCC): ICD-10-CM

## 2020-11-21 PROCEDURE — 80053 COMPREHEN METABOLIC PANEL: CPT

## 2020-11-21 PROCEDURE — 85025 COMPLETE CBC W/AUTO DIFF WBC: CPT

## 2020-11-21 PROCEDURE — 36415 COLL VENOUS BLD VENIPUNCTURE: CPT

## 2020-11-21 PROCEDURE — 83615 LACTATE (LD) (LDH) ENZYME: CPT

## 2020-11-23 ENCOUNTER — OFFICE VISIT (OUTPATIENT)
Dept: HEMATOLOGY/ONCOLOGY | Facility: HOSPITAL | Age: 45
End: 2020-11-23
Attending: INTERNAL MEDICINE
Payer: COMMERCIAL

## 2020-11-23 VITALS
WEIGHT: 118.19 LBS | BODY MASS INDEX: 21.47 KG/M2 | SYSTOLIC BLOOD PRESSURE: 111 MMHG | HEART RATE: 50 BPM | RESPIRATION RATE: 16 BRPM | HEIGHT: 62.01 IN | TEMPERATURE: 98 F | DIASTOLIC BLOOD PRESSURE: 54 MMHG | OXYGEN SATURATION: 100 %

## 2020-11-23 DIAGNOSIS — Z85.72 PERSONAL HISTORY OF NON-HODGKIN LYMPHOMAS: ICD-10-CM

## 2020-11-23 DIAGNOSIS — Z08 ENCOUNTER FOR FOLLOW-UP SURVEILLANCE OF LYMPHOMA: ICD-10-CM

## 2020-11-23 DIAGNOSIS — Z85.72 ENCOUNTER FOR FOLLOW-UP SURVEILLANCE OF LYMPHOMA: ICD-10-CM

## 2020-11-23 DIAGNOSIS — D69.6 THROMBOCYTOPENIA (HCC): ICD-10-CM

## 2020-11-23 DIAGNOSIS — C85.80 MARGINAL ZONE LYMPHOMA (HCC): Primary | ICD-10-CM

## 2020-11-23 PROCEDURE — 99213 OFFICE O/P EST LOW 20 MIN: CPT | Performed by: INTERNAL MEDICINE

## 2020-11-23 NOTE — PROGRESS NOTES
VERÓNICA Kendrick is a 54year old female here for f/u of Marginal zone lymphoma (hcc)  (primary encounter diagnosis)     She has been working out at home. Working from home. She is feeling well.       States flax seed oil is significantly di Oral Tab TAKE 1 TABLET BY MOUTH DAILY 30 tablet 0     Allergies:   No Known Allergies    Past Medical History:   Diagnosis Date   • Heart murmur     As a child   • Lymphoma ([facility] [address] 2015    non- Hodgkins Lymphoma   • Vertigo 2008     Past Surgical History: sounds. Extremities: Pedal pulses are present. No edema. Neurological: Grossly intact. Lymphatics: There is no palpable lymphadenopathy throughout in the cervical, supraclavicular, axillary, or inguinal regions.   Psych/Depression: normal.        ASSESS 35.5   RDW-SD      35.1 - 46.3 fL 41.9   RDW      11.0 - 15.0 % 14.0   Platelet Count      162.1 - 450.0 10(3)uL 119.0 (L)   Prelim Neutrophil Abs      1.50 - 7.70 x10 (3) uL 3.51   Neutrophils Absolute      1.50 - 7.70 x10(3) uL 3.51   Lymphocytes Absolut 13.2 13.7   Platelet Count      744.9 - 450.0 10(3)uL 126.0 (L) 123.0 (L)   Prelim Neutrophil Abs      1.50 - 7.70 x10 (3) uL 4.64 2.95   Neutrophils Absolute      1.50 - 7.70 x10(3) uL 4.64 2.95   Lymphocytes Absolute      1.00 - 4.00 x10(3) uL 1.28 1.18

## 2021-02-20 ENCOUNTER — LAB ENCOUNTER (OUTPATIENT)
Dept: LAB | Age: 46
End: 2021-02-20
Attending: INTERNAL MEDICINE
Payer: COMMERCIAL

## 2021-02-20 ENCOUNTER — HOSPITAL ENCOUNTER (OUTPATIENT)
Dept: CT IMAGING | Age: 46
Discharge: HOME | End: 2021-02-20
Attending: INTERNAL MEDICINE
Payer: COMMERCIAL

## 2021-02-20 DIAGNOSIS — C85.80 MARGINAL ZONE LYMPHOMA (HCC): ICD-10-CM

## 2021-02-20 DIAGNOSIS — Z85.72 ENCOUNTER FOR FOLLOW-UP SURVEILLANCE OF LYMPHOMA: ICD-10-CM

## 2021-02-20 DIAGNOSIS — Z08 ENCOUNTER FOR FOLLOW-UP SURVEILLANCE OF LYMPHOMA: ICD-10-CM

## 2021-02-20 DIAGNOSIS — Z85.72 PERSONAL HISTORY OF NON-HODGKIN LYMPHOMAS: ICD-10-CM

## 2021-02-20 LAB
ALBUMIN SERPL-MCNC: 4.1 G/DL (ref 3.4–5)
ALBUMIN/GLOB SERPL: 1.4 {RATIO} (ref 1–2)
ALP LIVER SERPL-CCNC: 96 U/L
ALT SERPL-CCNC: 25 U/L
ANION GAP SERPL CALC-SCNC: 6 MMOL/L (ref 0–18)
AST SERPL-CCNC: 17 U/L (ref 15–37)
BASOPHILS # BLD AUTO: 0.03 X10(3) UL (ref 0–0.2)
BASOPHILS NFR BLD AUTO: 0.5 %
BILIRUB SERPL-MCNC: 0.8 MG/DL (ref 0.1–2)
BUN BLD-MCNC: 19 MG/DL (ref 7–18)
BUN/CREAT SERPL: 18.6 (ref 10–20)
CALCIUM BLD-MCNC: 9.5 MG/DL (ref 8.5–10.1)
CHLORIDE SERPL-SCNC: 108 MMOL/L (ref 98–112)
CO2 SERPL-SCNC: 28 MMOL/L (ref 21–32)
CREAT BLD-MCNC: 0.9 MG/DL
CREAT BLD-MCNC: 1.02 MG/DL
DEPRECATED RDW RBC AUTO: 41.2 FL (ref 35.1–46.3)
EOSINOPHIL # BLD AUTO: 0.09 X10(3) UL (ref 0–0.7)
EOSINOPHIL NFR BLD AUTO: 1.4 %
ERYTHROCYTE [DISTWIDTH] IN BLOOD BY AUTOMATED COUNT: 12.7 % (ref 11–15)
GLOBULIN PLAS-MCNC: 2.9 G/DL (ref 2.8–4.4)
GLUCOSE BLD-MCNC: 90 MG/DL (ref 70–99)
HCT VFR BLD AUTO: 42.4 %
HGB BLD-MCNC: 14.4 G/DL
IMM GRANULOCYTES # BLD AUTO: 0.02 X10(3) UL (ref 0–1)
IMM GRANULOCYTES NFR BLD: 0.3 %
LDH SERPL L TO P-CCNC: 194 U/L
LYMPHOCYTES # BLD AUTO: 1.13 X10(3) UL (ref 1–4)
LYMPHOCYTES NFR BLD AUTO: 18.1 %
M PROTEIN MFR SERPL ELPH: 7 G/DL (ref 6.4–8.2)
MCH RBC QN AUTO: 30.2 PG (ref 26–34)
MCHC RBC AUTO-ENTMCNC: 34 G/DL (ref 31–37)
MCV RBC AUTO: 88.9 FL
MONOCYTES # BLD AUTO: 0.6 X10(3) UL (ref 0.1–1)
MONOCYTES NFR BLD AUTO: 9.6 %
NEUTROPHILS # BLD AUTO: 4.36 X10 (3) UL (ref 1.5–7.7)
NEUTROPHILS # BLD AUTO: 4.36 X10(3) UL (ref 1.5–7.7)
NEUTROPHILS NFR BLD AUTO: 70.1 %
OSMOLALITY SERPL CALC.SUM OF ELEC: 296 MOSM/KG (ref 275–295)
PATIENT FASTING Y/N/NP: NO
PLATELET # BLD AUTO: 122 10(3)UL (ref 150–450)
POTASSIUM SERPL-SCNC: 4.7 MMOL/L (ref 3.5–5.1)
RBC # BLD AUTO: 4.77 X10(6)UL
SODIUM SERPL-SCNC: 142 MMOL/L (ref 136–145)
WBC # BLD AUTO: 6.2 X10(3) UL (ref 4–11)

## 2021-02-20 PROCEDURE — 36415 COLL VENOUS BLD VENIPUNCTURE: CPT

## 2021-02-20 PROCEDURE — 71260 CT THORAX DX C+: CPT | Performed by: INTERNAL MEDICINE

## 2021-02-20 PROCEDURE — 85025 COMPLETE CBC W/AUTO DIFF WBC: CPT

## 2021-02-20 PROCEDURE — 70491 CT SOFT TISSUE NECK W/DYE: CPT | Performed by: INTERNAL MEDICINE

## 2021-02-20 PROCEDURE — 80053 COMPREHEN METABOLIC PANEL: CPT

## 2021-02-20 PROCEDURE — 74177 CT ABD & PELVIS W/CONTRAST: CPT | Performed by: INTERNAL MEDICINE

## 2021-02-20 PROCEDURE — 82565 ASSAY OF CREATININE: CPT

## 2021-02-20 PROCEDURE — 83615 LACTATE (LD) (LDH) ENZYME: CPT

## 2021-02-23 ENCOUNTER — OFFICE VISIT (OUTPATIENT)
Dept: HEMATOLOGY/ONCOLOGY | Facility: HOSPITAL | Age: 46
End: 2021-02-23
Attending: INTERNAL MEDICINE
Payer: COMMERCIAL

## 2021-02-23 VITALS
SYSTOLIC BLOOD PRESSURE: 110 MMHG | WEIGHT: 121 LBS | HEART RATE: 60 BPM | HEIGHT: 62 IN | OXYGEN SATURATION: 100 % | BODY MASS INDEX: 22.26 KG/M2 | TEMPERATURE: 97 F | RESPIRATION RATE: 16 BRPM | DIASTOLIC BLOOD PRESSURE: 65 MMHG

## 2021-02-23 DIAGNOSIS — C85.80 MARGINAL ZONE LYMPHOMA (HCC): Primary | ICD-10-CM

## 2021-02-23 PROCEDURE — 99213 OFFICE O/P EST LOW 20 MIN: CPT | Performed by: INTERNAL MEDICINE

## 2021-02-23 NOTE — PROGRESS NOTES
HPI   Lev Price is a 54year old female here for f/u of Marginal zone lymphoma (hcc)  (primary encounter diagnosis)     She has been working out, was able to run 3 miles today. Working from home. She is feeling well.       Denies pain or d • Vertigo 2008     Past Surgical History:   Procedure Laterality Date   • CHEMOTHERAPY  9/2018-1/2019    lymphoma   • EXCIS UTERINE FIBROID, [facility]  2012   • NM LYMPHOSCINTIGRAPHY  (CPT= 27535) Right 2015    right sided neck lymph nodes removed   • OT regions. Psych/Depression: normal.        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 Hep B and Hep C.     The patient continues to be asymptomatic w/o Prelim Neutrophil Abs      1.50 - 7.70 x10 (3) uL 4.36 3.51   Neutrophils Absolute      1.50 - 7.70 x10(3) uL 4.36 3.51   Lymphocytes Absolute      1.00 - 4.00 x10(3) uL 1.13 1.38   Monocytes Absolute      0.10 - 1.00 x10(3) uL 0.60 0.53   Eosinophils Ab STN/CHST/ABD/PEL W CONTRAST (KON=36337/03584/73373), 8/14/2020, 9:22 AM.  [facility], [facility]. [facility], CT STN/CHST/ABD/PEL W CONTRAST (CPT=70491/61010/74940), 2/08/2020, 9:52 AM.  [facility], CT STN/CHST/ABD/PEL W CONTRAST (CPT=70 enlarged.   VASCULATURE:            The thoracic aorta normal-variant configuration with a shared origin of the brachiocephalic trunk and left common carotid artery and with the right subclavian artery arising as the most distal vessel and following an Avis Deep obstruction. The appendix is not clearly delineated, but there are no suspicious inflammatory manifestations in the right lower quadrant. No suspicious mesenteric lymphadenopathy is apparent.   URINARY BLADDER: 2/20/2021 at 3:04 PM

## 2021-03-02 ENCOUNTER — TELEPHONE (OUTPATIENT)
Dept: HEMATOLOGY/ONCOLOGY | Facility: HOSPITAL | Age: 46
End: 2021-03-02

## 2021-03-02 NOTE — TELEPHONE ENCOUNTER
Patient called and stated that she was tested positive for Covid19. She was here on 2/23/21 for a f/u with Dr. Gee Thakkar. Patient states that she was feeling symptoms on Thursday and Friday,  2/25 and 2/26.  Patient just wanted you to be aware and she does state

## 2021-03-02 NOTE — TELEPHONE ENCOUNTER
Returned phone call. Per pt started having Covid symptoms on 2/25 and 2/26- slight headache,flu like symptoms. Pt denies fever,SOB or breathing difficulties. Pt states \" I am feeling better now. The pain to my ribs is gone and maybe was from working out. \

## 2021-05-20 ENCOUNTER — TELEPHONE (OUTPATIENT)
Dept: HEMATOLOGY/ONCOLOGY | Facility: HOSPITAL | Age: 46
End: 2021-05-20

## 2021-05-20 NOTE — TELEPHONE ENCOUNTER
Patient would like to get a Heme/Onc  In the [facility] area that she can be recommended to for her Heme/Onc care, please call patient.

## 2021-08-18 ENCOUNTER — APPOINTMENT (OUTPATIENT)
Dept: HEMATOLOGY/ONCOLOGY | Facility: HOSPITAL | Age: 46
End: 2021-08-18
Attending: INTERNAL MEDICINE
Payer: COMMERCIAL

## 2025-01-26 NOTE — PROGRESS NOTES
Pt here for C2D1 Bendeka and Rituxan. Arrives Ambulating independently. Modifications in dose or schedule: No    Pt tolerated her first Rituxan infusion well, thus will do rapid rate on this cycle.   Pt states she can no longer feel any of her lymph nodes None known

## (undated) NOTE — LETTER
Printed: 2018    Patient Name: Cherisse Favre  : 3/9/1975   Medical Record #: [id number]    Consent to Cancer Treatment    I, Cherisse Favre, understand that I have been diagnosed with marginal zone lymphoma (stage III).     I understand that t understand that complications from this treatment could even result in my death. Alternatives to this treatment have been explained to me and could include the option of no treatment. I also understand that I may stop this treatment at any time.     I

## (undated) NOTE — MR AVS SNAPSHOT
Luciana Butts   2017 5:00 PM   Office Visit   MRN:  [id number]    Description:  Female : 3/9/1975   Provider:  Sherren Brunner   Department:  [facility] Hematology Oncology              Visit Summary      Primary Visit Diagnosis information, go to [email or web address] and click on the Sign Up Now link in the [employer] box. Enter your [employer] Activation Code exactly as it appears below along with your Zip Code and Date of Birth to complete the sign-up process.  If you do

## (undated) NOTE — MR AVS SNAPSHOT
Odella Foot   3/1/2017 4:30 PM   Office Visit   MRN:  [id number]    Description:  Female : 3/9/1975   Provider:  Namrata Sheth   Department:  [facility] Hematology Oncology              Visit Summary      Primary Visit Diagnosis information, go to [email or web address] and click on the Sign Up Now link in the [employer] box. Enter your [email or web address] Activation Code exactly as it appears below along with your Zip Code and Date of Birth to complete the sign-up process.  If you do

## (undated) NOTE — LETTER
10/30/2018              [address] [id number]         To Whom It May Concern,    Stephen Childs is currently under my care. We encouraging her to receive flu shot at this time.  If  You have any further ques

## (undated) NOTE — LETTER
8/20/2020          To Whom It May Concern:    Trena Castellano is currently under my medical care. My patient is at higher risk of complications from [id number]-[id number] if she would contract the virus.  I have recommend that she preferentially work remotely if ab